# Patient Record
Sex: FEMALE | Race: WHITE | NOT HISPANIC OR LATINO | Employment: OTHER | URBAN - METROPOLITAN AREA
[De-identification: names, ages, dates, MRNs, and addresses within clinical notes are randomized per-mention and may not be internally consistent; named-entity substitution may affect disease eponyms.]

---

## 2021-05-10 ENCOUNTER — TELEPHONE (OUTPATIENT)
Dept: SCHEDULING | Facility: IMAGING CENTER | Age: 71
End: 2021-05-10

## 2021-05-13 ENCOUNTER — OFFICE VISIT (OUTPATIENT)
Dept: MEDICAL GROUP | Facility: MEDICAL CENTER | Age: 71
End: 2021-05-13
Payer: MEDICARE

## 2021-05-13 VITALS
WEIGHT: 182.32 LBS | HEART RATE: 71 BPM | TEMPERATURE: 97.5 F | HEIGHT: 65 IN | DIASTOLIC BLOOD PRESSURE: 78 MMHG | SYSTOLIC BLOOD PRESSURE: 116 MMHG | BODY MASS INDEX: 30.38 KG/M2 | OXYGEN SATURATION: 95 % | RESPIRATION RATE: 18 BRPM

## 2021-05-13 DIAGNOSIS — R32 INCONTINENCE OF URINE IN FEMALE: ICD-10-CM

## 2021-05-13 DIAGNOSIS — M25.551 CHRONIC RIGHT HIP PAIN: ICD-10-CM

## 2021-05-13 DIAGNOSIS — M54.10 BACK PAIN WITH RADICULOPATHY: ICD-10-CM

## 2021-05-13 DIAGNOSIS — Z77.22 SECOND HAND SMOKE EXPOSURE: ICD-10-CM

## 2021-05-13 DIAGNOSIS — Z12.11 SCREENING FOR COLON CANCER: ICD-10-CM

## 2021-05-13 DIAGNOSIS — M16.11 OSTEOARTHRITIS OF RIGHT HIP, UNSPECIFIED OSTEOARTHRITIS TYPE: ICD-10-CM

## 2021-05-13 DIAGNOSIS — E78.49 OTHER HYPERLIPIDEMIA: ICD-10-CM

## 2021-05-13 DIAGNOSIS — Z78.0 ASYMPTOMATIC MENOPAUSE: ICD-10-CM

## 2021-05-13 DIAGNOSIS — Z11.59 ENCOUNTER FOR HEPATITIS C SCREENING TEST FOR LOW RISK PATIENT: ICD-10-CM

## 2021-05-13 DIAGNOSIS — Z12.31 ENCOUNTER FOR SCREENING MAMMOGRAM FOR MALIGNANT NEOPLASM OF BREAST: ICD-10-CM

## 2021-05-13 DIAGNOSIS — M85.80 OSTEOPENIA, UNSPECIFIED LOCATION: ICD-10-CM

## 2021-05-13 DIAGNOSIS — R20.2 PARESTHESIA: ICD-10-CM

## 2021-05-13 DIAGNOSIS — G89.29 CHRONIC RIGHT HIP PAIN: ICD-10-CM

## 2021-05-13 PROBLEM — I87.2 PERIPHERAL VENOUS INSUFFICIENCY: Status: ACTIVE | Noted: 2020-01-10

## 2021-05-13 PROBLEM — I83.813 PAIN DUE TO VARICOSE VEINS OF BOTH LOWER EXTREMITIES: Status: ACTIVE | Noted: 2020-01-10

## 2021-05-13 PROCEDURE — 99204 OFFICE O/P NEW MOD 45 MIN: CPT | Performed by: INTERNAL MEDICINE

## 2021-05-13 RX ORDER — ESTRADIOL 0.1 MG/G
CREAM VAGINAL
COMMUNITY
Start: 2021-01-28 | End: 2022-03-02

## 2021-05-13 RX ORDER — GABAPENTIN 100 MG/1
CAPSULE ORAL
COMMUNITY
Start: 2020-04-30 | End: 2021-05-13

## 2021-05-13 RX ORDER — MELOXICAM 7.5 MG/1
TABLET ORAL
COMMUNITY
Start: 2019-07-22 | End: 2021-06-23 | Stop reason: SDUPTHER

## 2021-05-13 RX ORDER — ACETAMINOPHEN 500 MG
TABLET ORAL
COMMUNITY
Start: 2020-10-21 | End: 2021-10-21

## 2021-05-13 RX ORDER — DULOXETIN HYDROCHLORIDE 30 MG/1
30 CAPSULE, DELAYED RELEASE ORAL DAILY
Qty: 30 CAPSULE | Refills: 3 | Status: SHIPPED | OUTPATIENT
Start: 2021-05-13 | End: 2021-08-26 | Stop reason: SDUPTHER

## 2021-05-13 RX ORDER — TROSPIUM CHLORIDE 20 MG/1
20 TABLET, FILM COATED ORAL
COMMUNITY
Start: 2021-01-28 | End: 2021-05-13

## 2021-05-13 RX ORDER — ATORVASTATIN CALCIUM 40 MG/1
TABLET, FILM COATED ORAL
COMMUNITY
Start: 2020-06-05 | End: 2021-08-16 | Stop reason: SDUPTHER

## 2021-05-13 ASSESSMENT — PATIENT HEALTH QUESTIONNAIRE - PHQ9: CLINICAL INTERPRETATION OF PHQ2 SCORE: 0

## 2021-05-13 NOTE — PROGRESS NOTES
CC:  Diagnoses of Encounter for hepatitis C screening test for low risk patient, Chronic right hip pain, Encounter for screening mammogram for malignant neoplasm of breast, Back pain with radiculopathy, Paresthesia, Incontinence of urine in female, Asymptomatic menopause, Other hyperlipidemia, Osteoarthritis of right hip, unspecified osteoarthritis type, Osteopenia, unspecified location, Second hand smoke exposure, Screening for colon cancer, and BMI 30.0-30.9,adult were pertinent to this visit.    HISTORY OF THE PRESENT ILLNESS: Patient is a 70 y.o. female. This pleasant patient is here today to establish care.    Main concern is chronic right hip and lower back pain.  She has back pain radiate down her right leg towards her foot and sometimes to a lesser extent the left leg.  Has not noticed any leg weakness but sometimes difficult to tell through the pain.  Feet occasionally tingle.  No loss of bowel.  Does have urinary incontinence primarily just at night 1 time.  Did see urologist and reports she had a lot of testing done, was prescribed estrogen vaginal cream which was somewhat helpful and is doing Kegel exercises on her own.  She was told by her urologist that she would benefit from some additional testing so she would like to establish locally.  She has not been any other pharmaceutical therapy, she had trospium on her medication list but tells me she had not yet started as her urologist told her to hold off.  She has had some degree of physical therapy but does indicate she needs to reestablish.  She would also like local orthopedic specialist as she has had 3 steroid injections to right hip and 2 MARICARMEN procedures lumbar spine.  States her last MRI imaging of the hip/ lumbar area was roughly 6 months ago she will try to obtain this report for us.    L-MRI 10/8/2020  IMPRESSION:   1. Relatively mild degenerative type changes as described above.   2. No significant central narrowing. No compression or intrinsic    lesion of the conus or cauda equina.   3. Probable left L3 nerve root impingement in the lateral left L3-4   foramen.   4. Disc abuts both L4 nerve roots in the lateral L3-4 recesses   without definite nerve root impingement. Probable mild compression of   the left L4 nerve roots just past the left L4-5 foramen.         Allergies: Gabapentin    Current Outpatient Medications Ordered in Epic   Medication Sig Dispense Refill   • acetaminophen (TYLENOL) 500 MG Tab Take 2 tablet by mouth every 8 hours as needed for pain. Do not exceed 6 tablets in 24 hours     • atorvastatin (LIPITOR) 40 MG Tab Take 1 tablet by mouth daily to lower cholesterol and keep arteries open     • estradiol (ESTRACE) 0.1 MG/GM vaginal cream Insert 1 gram vaginally 2 to 3 times a week at bedtime     • hydrocortisone 2.5 % Cream topical cream Apply  topically.     • meloxicam (MOBIC) 7.5 MG Tab Take  by mouth.     • DULoxetine (CYMBALTA) 30 MG Cap DR Particles Take 1 capsule by mouth every day. 30 capsule 3     No current [facility]-ordered facility-administered medications on file.       Past Medical History:   Diagnosis Date   • Hyperlipidemia        Past Surgical History:   Procedure Laterality Date   • OTHER      trigger finger R; R radial fracture repair no hardare (pins taken out), CTR b/l        Social History     Tobacco Use   • Smoking status: Never Smoker   • Smokeless tobacco: Never Used   • Tobacco comment: second hand smoke casinos x 28 years   Vaping Use   • Vaping Use: Never used   Substance Use Topics   • Alcohol use: Yes     Comment: 1 per week   • Drug use: Not on file       Social History     Social History Narrative   • Not on file       Family History   Problem Relation Age of Onset   • Cancer Mother         breast. lung   • Cancer Father         colon   • Diabetes Brother        Exam: /78 (BP Location: Right arm, Patient Position: Sitting, BP Cuff Size: Adult)   Pulse 71   Temp 36.4 °C (97.5 °F) (Temporal)   Resp 18    "Ht 1.638 m (5' 4.5\")   Wt 82.7 kg (182 lb 5.1 oz)   SpO2 95%  Body mass index is 30.81 kg/m².    General: Normal appearing. No distress.  EYES: Conjunctiva clear lids without ptosis, pupils equal  EARS: Normal shape and contour   Pulmonary: Clear to ausculation.  Normal effort. No rales or wheezing.  Cardiovascular: Regular rate and rhythm without significant murmur.   Abdomen: Soft, nontender, nondistended. Normal bowel sounds.  Neurologic: Cranial nerves grossly nonfocal  Skin: Warm and dry.  No obvious lesions.  Musculoskeletal: Normal gait. No extremity cyanosis, clubbing, or edema.  Psych: Normal mood and affect. Alert and oriented x3. Judgment and insight is normal.      Assessment/Plan  1. Encounter for hepatitis C screening test for low risk patient  She is agreeable to this preventive health guideline given birth cohort  - HEP C VIRUS ANTIBODY; Future    2. Chronic right hip pain  She is already on meloxicam.  Did try gabapentin and this medication gave her depression.  She is agreeable to try Cymbalta, establish with physical therapy and follow-up orthopedic team.  Sounds like she does have significant arthritis in her hip and she may ultimately need a replacement.  Also asked her to obtain imaging (done approx 6 m/a) reports and CD as well as any other related information for her specialty team and also for me in follow-up.  - REFERRAL TO ORTHOPEDICS  - REFERRAL TO PHYSICAL THERAPY  - DULoxetine (CYMBALTA) 30 MG Cap DR Particles; Take 1 capsule by mouth every day.  Dispense: 30 capsule; Refill: 3    3. Encounter for screening mammogram for malignant neoplasm of breast  Asymptomatic due for screening  - MA-SCREENING MAMMO BILAT W/TOMOSYNTHESIS W/CAD; Future    4. Back pain with radiculopathy  See #2 above  - REFERRAL TO ORTHOPEDICS  - REFERRAL TO PHYSICAL THERAPY  - DULoxetine (CYMBALTA) 30 MG Cap DR Particles; Take 1 capsule by mouth every day.  Dispense: 30 capsule; Refill: 3    5. Paresthesia  See #2 " above likely the tingling in feet is due to lumbar source  - DULoxetine (CYMBALTA) 30 MG Cap DR Particles; Take 1 capsule by mouth every day.  Dispense: 30 capsule; Refill: 3  - CBC WITHOUT DIFFERENTIAL; Future  - Comp Metabolic Panel; Future    6. Incontinence of urine in female  See HPI  - REFERRAL TO PHYSICAL THERAPY  - REFERRAL TO UROLOGY    7. Asymptomatic menopause  - DS-BONE DENSITY STUDY (DEXA); Future    8. Other hyperlipidemia  Plan to reassess status and continue Lipitor 40 mg daily.  - Comp Metabolic Panel; Future  - Lipid Profile; Future    9. Osteoarthritis of right hip, unspecified osteoarthritis type  See discussion above    10. Osteopenia, unspecified location  Reassess bone density.  She is not currently on supplements for bone health recommended calcium to 1200 mg daily vitamin D 1000 units daily  - DS-BONE DENSITY STUDY (DEXA); Future    11. 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    12. Screening for colon cancer  States last colonoscopy about 5 years ago and she has family history of colon cancer in her father.  She has no current GI symptoms of concern.  - REFERRAL TO GI FOR COLONOSCOPY    13. BMI 30.0-30.9,adult  She has lost over 30 pounds in 2 years through nutrition counseling and she would like to continue this.  - Patient identified as having weight management issue.  Appropriate orders and counseling given.  - REFERRAL TO NUTRITION SERVICES      rtc 2-3m        Please note that this dictation was created using voice recognition software. I have made every reasonable attempt to correct obvious errors, but I expect that there are errors of grammar and possibly content that I did not discover before finalizing the note.

## 2021-05-20 ENCOUNTER — HOSPITAL ENCOUNTER (OUTPATIENT)
Dept: RADIOLOGY | Facility: MEDICAL CENTER | Age: 71
End: 2021-05-20
Attending: INTERNAL MEDICINE
Payer: MEDICARE

## 2021-05-20 DIAGNOSIS — M85.80 OSTEOPENIA, UNSPECIFIED LOCATION: ICD-10-CM

## 2021-05-20 DIAGNOSIS — Z78.0 ASYMPTOMATIC MENOPAUSE: ICD-10-CM

## 2021-05-20 PROCEDURE — 77080 DXA BONE DENSITY AXIAL: CPT

## 2021-06-08 ENCOUNTER — HOSPITAL ENCOUNTER (OUTPATIENT)
Dept: LAB | Facility: MEDICAL CENTER | Age: 71
End: 2021-06-08
Attending: INTERNAL MEDICINE
Payer: MEDICARE

## 2021-06-08 DIAGNOSIS — E78.49 OTHER HYPERLIPIDEMIA: ICD-10-CM

## 2021-06-08 DIAGNOSIS — R20.2 PARESTHESIA: ICD-10-CM

## 2021-06-08 DIAGNOSIS — Z11.59 ENCOUNTER FOR HEPATITIS C SCREENING TEST FOR LOW RISK PATIENT: ICD-10-CM

## 2021-06-08 LAB
ALBUMIN SERPL BCP-MCNC: 4.5 G/DL (ref 3.2–4.9)
ALBUMIN/GLOB SERPL: 1.7 G/DL
ALP SERPL-CCNC: 100 U/L (ref 30–99)
ALT SERPL-CCNC: 56 U/L (ref 2–50)
ANION GAP SERPL CALC-SCNC: 8 MMOL/L (ref 7–16)
AST SERPL-CCNC: 35 U/L (ref 12–45)
BILIRUB SERPL-MCNC: 0.4 MG/DL (ref 0.1–1.5)
BUN SERPL-MCNC: 13 MG/DL (ref 8–22)
CALCIUM SERPL-MCNC: 9.4 MG/DL (ref 8.5–10.5)
CHLORIDE SERPL-SCNC: 101 MMOL/L (ref 96–112)
CHOLEST SERPL-MCNC: 199 MG/DL (ref 100–199)
CO2 SERPL-SCNC: 28 MMOL/L (ref 20–33)
CREAT SERPL-MCNC: 0.5 MG/DL (ref 0.5–1.4)
ERYTHROCYTE [DISTWIDTH] IN BLOOD BY AUTOMATED COUNT: 46.5 FL (ref 35.9–50)
FASTING STATUS PATIENT QL REPORTED: NORMAL
GLOBULIN SER CALC-MCNC: 2.7 G/DL (ref 1.9–3.5)
GLUCOSE SERPL-MCNC: 102 MG/DL (ref 65–99)
HCT VFR BLD AUTO: 40.3 % (ref 37–47)
HCV AB SER QL: NORMAL
HDLC SERPL-MCNC: 87 MG/DL
HGB BLD-MCNC: 12.7 G/DL (ref 12–16)
LDLC SERPL CALC-MCNC: 95 MG/DL
MCH RBC QN AUTO: 28.1 PG (ref 27–33)
MCHC RBC AUTO-ENTMCNC: 31.5 G/DL (ref 33.6–35)
MCV RBC AUTO: 89.2 FL (ref 81.4–97.8)
PLATELET # BLD AUTO: 319 K/UL (ref 164–446)
PMV BLD AUTO: 10.2 FL (ref 9–12.9)
POTASSIUM SERPL-SCNC: 3.9 MMOL/L (ref 3.6–5.5)
PROT SERPL-MCNC: 7.2 G/DL (ref 6–8.2)
RBC # BLD AUTO: 4.52 M/UL (ref 4.2–5.4)
SODIUM SERPL-SCNC: 137 MMOL/L (ref 135–145)
TRIGL SERPL-MCNC: 84 MG/DL (ref 0–149)
WBC # BLD AUTO: 6.3 K/UL (ref 4.8–10.8)

## 2021-06-08 PROCEDURE — 86803 HEPATITIS C AB TEST: CPT

## 2021-06-08 PROCEDURE — 80061 LIPID PANEL: CPT

## 2021-06-08 PROCEDURE — 85027 COMPLETE CBC AUTOMATED: CPT

## 2021-06-08 PROCEDURE — 36415 COLL VENOUS BLD VENIPUNCTURE: CPT

## 2021-06-08 PROCEDURE — 80053 COMPREHEN METABOLIC PANEL: CPT

## 2021-06-09 ENCOUNTER — PATIENT MESSAGE (OUTPATIENT)
Dept: MEDICAL GROUP | Facility: MEDICAL CENTER | Age: 71
End: 2021-06-09

## 2021-06-09 ENCOUNTER — OFFICE VISIT (OUTPATIENT)
Dept: HEALTH INFORMATION MANAGEMENT | Facility: MEDICAL CENTER | Age: 71
End: 2021-06-09
Payer: MEDICARE

## 2021-06-09 VITALS — WEIGHT: 184.3 LBS | BODY MASS INDEX: 31.15 KG/M2

## 2021-06-09 DIAGNOSIS — E66.9 OBESITY, UNSPECIFIED CLASSIFICATION, UNSPECIFIED OBESITY TYPE, UNSPECIFIED WHETHER SERIOUS COMORBIDITY PRESENT: ICD-10-CM

## 2021-06-09 DIAGNOSIS — R29.818 SUSPECTED SLEEP APNEA: ICD-10-CM

## 2021-06-09 DIAGNOSIS — M16.11 OSTEOARTHRITIS OF RIGHT HIP, UNSPECIFIED OSTEOARTHRITIS TYPE: ICD-10-CM

## 2021-06-09 DIAGNOSIS — M54.10 BACK PAIN WITH RADICULOPATHY: ICD-10-CM

## 2021-06-09 DIAGNOSIS — E78.49 OTHER HYPERLIPIDEMIA: ICD-10-CM

## 2021-06-09 PROCEDURE — 97802 MEDICAL NUTRITION INDIV IN: CPT | Performed by: DIETITIAN, REGISTERED

## 2021-06-09 ASSESSMENT — FIBROSIS 4 INDEX: FIB4 SCORE: 1

## 2021-06-09 NOTE — PROGRESS NOTES
6/9/2021    iTa Saunders M.D.  70 y.o.   Time in/out: 8:33-9:31    Anthropometrics/Objective  Vitals:    06/09/21 0940   Weight: 83.6 kg (184 lb 4.8 oz)       Body mass index is 31.15 kg/m².    Stated Goal Weight: 150 lbs (last at this weight 20 years ago)  Estimated Caloric needs 1350 Kcal (MSJ)   See comprehensive patient history form for further information     Subjective:  - retired 11 years ago    - was caretaking for her second mother she states  - joint/back issues  - family history of cancers  - moved in with her brother and sister in law  - got cane last week  - per PT no movement requested   - seeing PT today and will ask  - would like to work on portions  - [facility] weight management program   - lost 30 lbs  - biggest challenge accountability  - successful with journaling   - disruptive sleep patterns d/t discomfort   - told she has stopped breathing in her sleep  - ortho in July    Nutrition Diagnosis (PES Statement)  Problem (Nutrition diagnosis)  Clinical: Obesity    Etiology(Addresses the cause,contributing factors)  Excessive energy intake    Signs/Symptoms (Address observations and stated info: subjective and objective data)  Reports or observations of CHO/PRO/FAT intake that is different from recommended types or ingested on an irregular basis      Client history:  Condition(s) associated with a diagnosis or treatment (specify) high cholesterol, elevated fasting blood glucose, compressed disks, osteopenia, second hand smoke exposure    Biochemical data, medical test and procedures  Lab Results   Component Value Date/Time    HBA1C 5.7 (H) 12/10/2020 12:52 PM   @  No results found for: POCGLUCOSE  Lab Results   Component Value Date/Time    CHOLSTRLTOT 199 06/08/2021 08:40 AM    LDL 95 06/08/2021 08:40 AM    HDL 87 06/08/2021 08:40 AM    TRIGLYCERIDE 84 06/08/2021 08:40 AM         Nutrition Intervention    Meal and Snack  Recommend a general/healthful diet    Comprehensive Nutrition  education Instruction or training leading to in-depth nutrition related knowledge about:  Combine carb, protein and fat at each meal, Meal timing and spacing and Portion control    Monitoring & Evaluation Plan    Behavioral-Environmental:  Behavior: track/journal intake     Food / Nutrient Intake:  Food intake: follow the plate    Physical Signs / Symptoms:  Weight change: loss of -0.5-2 lbs/week    Assessment Notes:  Ashley would like to continue working on her weight loss goals. She has successfully lost about 30 lbs with [facility] management program. Finds her weight increasing slowly with her move to [place]. At this visit we discussed and reviewed the plate method, snack ideas, DMBTP cookbook for meal ideas resources.   We reviewed her sleep and exercise habits. Sleep patterns are disrupted due to discomfort often, been told she stopped breathing when she sleeps - sent message to PCP requesting sleep study eval. Her movement is currently limited her PT request. Has been walking with a cane x1 week per their suggestion. Is interested in water movement if able. Ashley will connect with PT regarding this.  At next visit suggest reviewing nutrition basics class.     Follow up: 4-6 weeks

## 2021-06-16 ENCOUNTER — HOSPITAL ENCOUNTER (OUTPATIENT)
Dept: RADIOLOGY | Facility: MEDICAL CENTER | Age: 71
End: 2021-06-16
Payer: MEDICARE

## 2021-06-22 ENCOUNTER — TELEPHONE (OUTPATIENT)
Dept: MEDICAL GROUP | Facility: MEDICAL CENTER | Age: 71
End: 2021-06-22

## 2021-06-23 RX ORDER — MELOXICAM 7.5 MG/1
7.5 TABLET ORAL DAILY
Qty: 30 TABLET | Refills: 0 | Status: SHIPPED | OUTPATIENT
Start: 2021-06-23 | End: 2021-07-19 | Stop reason: SDUPTHER

## 2021-07-19 RX ORDER — MELOXICAM 7.5 MG/1
7.5 TABLET ORAL DAILY
Qty: 30 TABLET | Refills: 0 | Status: SHIPPED | OUTPATIENT
Start: 2021-07-19 | End: 2021-08-16 | Stop reason: SDUPTHER

## 2021-07-28 ENCOUNTER — PATIENT MESSAGE (OUTPATIENT)
Dept: HEALTH INFORMATION MANAGEMENT | Facility: OTHER | Age: 71
End: 2021-07-28

## 2021-07-29 PROBLEM — M25.551 RIGHT HIP PAIN: Status: ACTIVE | Noted: 2021-07-29

## 2021-08-16 ENCOUNTER — PATIENT MESSAGE (OUTPATIENT)
Dept: MEDICAL GROUP | Facility: MEDICAL CENTER | Age: 71
End: 2021-08-16

## 2021-08-16 RX ORDER — MELOXICAM 7.5 MG/1
7.5 TABLET ORAL DAILY
Qty: 30 TABLET | Refills: 3 | Status: SHIPPED | OUTPATIENT
Start: 2021-08-16 | End: 2021-12-17

## 2021-08-16 RX ORDER — ATORVASTATIN CALCIUM 40 MG/1
40 TABLET, FILM COATED ORAL DAILY
Qty: 90 TABLET | Refills: 3 | Status: SHIPPED | OUTPATIENT
Start: 2021-08-16 | End: 2022-05-04 | Stop reason: SDUPTHER

## 2021-08-16 NOTE — PATIENT COMMUNICATION
Received request via: Patient    Was the patient seen in the last year in this department? Yes    Does the patient have an active prescription (recently filled or refills available) for medication(s) requested? No     Requested Prescriptions     Pending Prescriptions Disp Refills   • atorvastatin (LIPITOR) 40 MG Tab 90 Tablet    • meloxicam (MOBIC) 7.5 MG Tab 30 Tablet 0     Sig: Take 1 Tablet by mouth every day.       Pt requests 3 month supply

## 2021-08-26 ENCOUNTER — OFFICE VISIT (OUTPATIENT)
Dept: MEDICAL GROUP | Facility: MEDICAL CENTER | Age: 71
End: 2021-08-26
Payer: MEDICARE

## 2021-08-26 VITALS
OXYGEN SATURATION: 94 % | TEMPERATURE: 97.9 F | WEIGHT: 181 LBS | HEIGHT: 65 IN | BODY MASS INDEX: 30.16 KG/M2 | SYSTOLIC BLOOD PRESSURE: 120 MMHG | DIASTOLIC BLOOD PRESSURE: 76 MMHG | HEART RATE: 65 BPM

## 2021-08-26 DIAGNOSIS — R73.01 IMPAIRED FASTING BLOOD SUGAR: ICD-10-CM

## 2021-08-26 DIAGNOSIS — E78.49 OTHER HYPERLIPIDEMIA: ICD-10-CM

## 2021-08-26 DIAGNOSIS — R74.8 ELEVATED LIVER ENZYMES: ICD-10-CM

## 2021-08-26 DIAGNOSIS — M25.551 CHRONIC RIGHT HIP PAIN: ICD-10-CM

## 2021-08-26 DIAGNOSIS — R32 INCONTINENCE OF URINE IN FEMALE: ICD-10-CM

## 2021-08-26 DIAGNOSIS — G89.29 CHRONIC RIGHT HIP PAIN: ICD-10-CM

## 2021-08-26 DIAGNOSIS — R29.818 SUSPECTED SLEEP APNEA: ICD-10-CM

## 2021-08-26 DIAGNOSIS — R20.2 PARESTHESIA: ICD-10-CM

## 2021-08-26 DIAGNOSIS — M54.10 BACK PAIN WITH RADICULOPATHY: ICD-10-CM

## 2021-08-26 PROBLEM — M43.10 SPONDYLOLISTHESIS: Status: ACTIVE | Noted: 2021-07-13

## 2021-08-26 PROBLEM — M48.061 SPINAL STENOSIS OF LUMBAR REGION: Status: ACTIVE | Noted: 2021-07-13

## 2021-08-26 PROCEDURE — 99214 OFFICE O/P EST MOD 30 MIN: CPT | Performed by: INTERNAL MEDICINE

## 2021-08-26 RX ORDER — DIPHENHYDRAMINE HCL 25 MG
25 TABLET ORAL PRN
COMMUNITY

## 2021-08-26 RX ORDER — DULOXETIN HYDROCHLORIDE 30 MG/1
30 CAPSULE, DELAYED RELEASE ORAL 2 TIMES DAILY
Qty: 200 CAPSULE | Refills: 3 | Status: SHIPPED | OUTPATIENT
Start: 2021-08-26 | End: 2022-02-14

## 2021-08-26 ASSESSMENT — FIBROSIS 4 INDEX: FIB4 SCORE: 1.01

## 2021-08-26 NOTE — PROGRESS NOTES
CC:  Diagnoses of Chronic right hip pain, Back pain with radiculopathy, Paresthesia, BMI 30.0-30.9,adult, Elevated liver enzymes, Impaired fasting blood sugar, Other hyperlipidemia, Suspected sleep apnea, and Incontinence of urine in female were pertinent to this visit.    HISTORY OF THE PRESENT ILLNESS: Patient is a 71 y.o. female. This pleasant patient is here today for follow-up.    Since last visit she has follow-up with the specialist.    PT note indicates plan for right total hip 10/20/2021.  The neurosurgeon Dr. Jolley subsequently plans to do perform procedure to improve her back pain symptoms and until then bridge her with MARICARMEN.  Cymbalta has helped her pain some but mostly her mood feels improved and she would like to try a higher dose.  She is hopeful that possibly higher dose of Cymbalta will also improve her urinary symptoms.  Pelvic physical therapy has had helped her urinary incontinence and she did also meet with a urologist who recommended that she start Myrbetriq.  She has not yet started Myrbetriq, she wants to wait and see and try to minimize pharmacotherapy.    Did get her colonoscopy 7/13/2021, due again in 5 years.  No current GI symptoms.  Lab 6/8/2021 show AST of 56, she has 1 drink of alcohol per week or less, for pain she takes Tylenol 2448-3500 mg daily, has no focal GI symptoms such as nausea/vomiting/pain, etc. BMI 30.  Metabolic panel also showed fasting glucose of 102 but otherwise CBC was reasonable and cholesterol was controlled total cholesterol 199, triglycerides 84, HDL 87, LDL 95.    Bone density scan 5/20/2021 showed lumbar T score -1.2 with some osteopenia, left hip T score -0.3 overall 10-year fracture score is 13.4% and at the hip 1.1%.  She says she is on calcium, vitamin D.  She would like to start daily aerobic exercise and some weight lifting once her back and hip are better.    She is tracking down her mammogram report she thinks it was roughly 3 months ago but it is not  "in our system.    She still wants evaluated sleep apnea she says her friends have told her she has apneic episodes.    Allergies: Gabapentin    Current Outpatient Medications Ordered in Epic   Medication Sig Dispense Refill   • DULoxetine (CYMBALTA) 30 MG Cap DR Particles Take 1 Capsule by mouth 2 times a day. 200 Capsule 3   • atorvastatin (LIPITOR) 40 MG Tab Take 1 Tablet by mouth every day. 90 Tablet 3   • meloxicam (MOBIC) 7.5 MG Tab Take 1 Tablet by mouth every day. 30 Tablet 3   • acetaminophen (TYLENOL) 500 MG Tab Take 2 tablet by mouth every 8 hours as needed for pain. Do not exceed 6 tablets in 24 hours     • ASPIRIN 81 PO Take 1 Tablet by mouth every day.     • estradiol (ESTRACE) 0.1 MG/GM vaginal cream Insert 1 gram vaginally 2 to 3 times a week at bedtime (Patient not taking: Reported on 8/26/2021)       No current [facility]-ordered facility-administered medications on file.       Past Medical History:   Diagnosis Date   • Hyperlipidemia        Past Surgical History:   Procedure Laterality Date   • OTHER      trigger finger R; R radial fracture repair no hardare (pins taken out), CTR b/l        Social History     Tobacco Use   • Smoking status: Never Smoker   • Smokeless tobacco: Never Used   • Tobacco comment: second hand smoke casinos x 28 years   Vaping Use   • Vaping Use: Never used   Substance Use Topics   • Alcohol use: Yes     Comment: 1 per week   • Drug use: Not on file       Social History     Social History Narrative   • Not on file       Family History   Problem Relation Age of Onset   • Cancer Mother         breast. lung   • Cancer Father         colon   • Diabetes Brother        Exam: /76 (BP Location: Right arm, Patient Position: Sitting, BP Cuff Size: Adult)   Pulse 65   Temp 36.6 °C (97.9 °F) (Temporal)   Ht 1.64 m (5' 4.57\")   Wt 82.1 kg (181 lb)   SpO2 94%  Body mass index is 30.52 kg/m².    General: Normal appearing. No distress.  EYES: Conjunctiva clear lids without ptosis, " pupils equal  EARS: Normal shape and contour   Pulmonary: Clear to ausculation.  Normal effort. No rales or wheezing.  Cardiovascular: Regular rate and rhythm   Abdomen: Soft, nontender, nondistended. Normal bowel sounds.  Neurologic: Cranial nerves grossly nonfocal  Skin: Warm and dry.  No obvious lesions.  Musculoskeletal: Normal gait. No extremity cyanosis, clubbing, or edema.  Psych: Normal mood and affect. Alert and oriented x3. Judgment and insight is normal.      Assessment/Plan  1. Chronic right hip pain  And uptitrate Cymbalta from 30 mg to total daily dose 60 mg/day.  Patient prefers divided doses.  We will follow up response.  - DULoxetine (CYMBALTA) 30 MG Cap DR Particles; Take 1 Capsule by mouth 2 times a day.  Dispense: 200 Capsule; Refill: 3    2. Back pain with radiculopathy  Reference #1 above, in addition she is pending right hip replacement in neurosurgery for her symptoms as well as continuing physical therapy.  - DULoxetine (CYMBALTA) 30 MG Cap DR Particles; Take 1 Capsule by mouth 2 times a day.  Dispense: 200 Capsule; Refill: 3    3. Paresthesia  Refer to #1 and 2 above  - DULoxetine (CYMBALTA) 30 MG Cap DR Particles; Take 1 Capsule by mouth 2 times a day.  Dispense: 200 Capsule; Refill: 3    4. BMI 30.0-30.9,adult  We will work on increasing exercise and other weight loss measures when she is feeling better from a musculoskeletal standpoint.  For now goal to lose 10 pounds suspect minimal elevation liver enzyme is due to fatty liver.  Since she is asymptomatic we decided liver imaging is not needed but will follow-up on LFT trend.  Also recommend [facility] for free gene testing as it relates to hepatic steatosis.    5. Elevated liver enzymes  #4 above  - HEPATIC FUNCTION PANEL; Future    6. Impaired fasting blood sugar  Diet and exercise encouraged, we will reevaluate  - HEMOGLOBIN A1C; Future    7. Other hyperlipidemia  Controlled continue Lipitor 40 mg daily.    8. Suspected  sleep apnea  - REFERRAL TO PULMONARY AND SLEEP MEDICINE    9. Incontinence of urine in female  Advised would be reasonable to start Myrbetriq as recommended by her urologist.  Potential that uptitrating Cymbalta may have some benefit.  Cymbalta is off label treatment for stress urinary incontinence in women. Continue pelvic physical therapy.        RTC 3 months annual wellness      Please note that this dictation was created using voice recognition software. I have made every reasonable attempt to correct obvious errors, but I expect that there are errors of grammar and possibly content that I did not discover before finalizing the note.

## 2021-08-26 NOTE — LETTER
[facility]  Tia Saunders M.D.  [address]  [place] [address]  Fax: [phone]   Authorization for Release/Disclosure of   Protected Health Information   Name: GALE GUSMAN : 1950 SSN: [id number]   Address: [address] [place] [address]  [place] [address] Phone:    [phone] (home)    I authorize the entity listed below to release/disclose the PHI below to:   [facility]/Tia Saunders M.D. and Tia Saunders M.D.   Provider or Entity Name:  [facility]  Dr. Jolley   [facility], [address]  [address], [place] [address] Phone:  [phone]    Fax:   [phone]   Reason for request: continuity of care   Information to be released:    [  ] LAST COLONOSCOPY,  including any PATH REPORT and follow-up  [  ] LAST FIT/COLOGUARD RESULT [  ] LAST DEXA  [  ] LAST MAMMOGRAM  [  ] LAST PAP  [  ] LAST LABS [  ] RETINA EXAM REPORT  [  ] IMMUNIZATION RECORDS  [ x] Release all info      [  ] Check here and initial the line next to each item to release ALL health information INCLUDING  _____ Care and treatment for drug and / or alcohol abuse  _____ HIV testing, infection status, or AIDS  _____ Genetic Testing    DATES OF SERVICE OR TIME PERIOD TO BE DISCLOSED: _____________  I understand and acknowledge that:  * This Authorization may be revoked at any time by you in writing, except if your health information has already been used or disclosed.  * Your health information that will be used or disclosed as a result of you signing this authorization could be re-disclosed by the recipient. If this occurs, your re-disclosed health information may no longer be protected by State or Federal laws.  * You may refuse to sign this Authorization. Your refusal will not affect your ability to obtain treatment.  * This Authorization becomes effective upon signing and will  on (date) __________.      If no date is indicated, this Authorization will  one (1) year from the  signature date.    Name: Garima Banda    Signature:   Date:     8/26/2021       PLEASE FAX REQUESTED RECORDS BACK TO: [phone]

## 2021-09-08 ENCOUNTER — PATIENT MESSAGE (OUTPATIENT)
Dept: MEDICAL GROUP | Facility: MEDICAL CENTER | Age: 71
End: 2021-09-08

## 2021-09-17 ENCOUNTER — TELEPHONE (OUTPATIENT)
Dept: HEALTH INFORMATION MANAGEMENT | Facility: OTHER | Age: 71
End: 2021-09-17

## 2021-09-17 NOTE — TELEPHONE ENCOUNTER
Outcome: Received televox scheduled mammo    Please transfer to Patient Outreach Team at [phone] when patient returns call.        [facility] Verified: yes    Attempt # 1

## 2021-09-20 ENCOUNTER — HOSPITAL ENCOUNTER (OUTPATIENT)
Dept: LAB | Facility: MEDICAL CENTER | Age: 71
End: 2021-09-20
Attending: INTERNAL MEDICINE
Payer: MEDICARE

## 2021-09-20 DIAGNOSIS — R73.01 IMPAIRED FASTING BLOOD SUGAR: ICD-10-CM

## 2021-09-20 DIAGNOSIS — R74.8 ELEVATED LIVER ENZYMES: ICD-10-CM

## 2021-09-20 LAB
EST. AVERAGE GLUCOSE BLD GHB EST-MCNC: 120 MG/DL
HBA1C MFR BLD: 5.8 % (ref 4–5.6)

## 2021-09-20 PROCEDURE — 83036 HEMOGLOBIN GLYCOSYLATED A1C: CPT

## 2021-09-20 PROCEDURE — 36415 COLL VENOUS BLD VENIPUNCTURE: CPT

## 2021-09-20 PROCEDURE — 80076 HEPATIC FUNCTION PANEL: CPT

## 2021-09-21 LAB
ALBUMIN SERPL BCP-MCNC: 4.5 G/DL (ref 3.2–4.9)
ALP SERPL-CCNC: 84 U/L (ref 30–99)
ALT SERPL-CCNC: 15 U/L (ref 2–50)
AST SERPL-CCNC: 21 U/L (ref 12–45)
BILIRUB CONJ SERPL-MCNC: <0.2 MG/DL (ref 0.1–0.5)
BILIRUB INDIRECT SERPL-MCNC: NORMAL MG/DL (ref 0–1)
BILIRUB SERPL-MCNC: 0.4 MG/DL (ref 0.1–1.5)
PROT SERPL-MCNC: 6.9 G/DL (ref 6–8.2)

## 2021-10-12 ENCOUNTER — PRE-ADMISSION TESTING (OUTPATIENT)
Dept: ADMISSIONS | Facility: MEDICAL CENTER | Age: 71
End: 2021-10-12
Attending: ORTHOPAEDIC SURGERY
Payer: MEDICARE

## 2021-10-12 DIAGNOSIS — Z01.810 PRE-OPERATIVE CARDIOVASCULAR EXAMINATION: ICD-10-CM

## 2021-10-12 DIAGNOSIS — Z01.812 PRE-OPERATIVE LABORATORY EXAMINATION: ICD-10-CM

## 2021-10-12 DIAGNOSIS — M25.551 RIGHT HIP PAIN: ICD-10-CM

## 2021-10-12 LAB
ABO GROUP BLD: NORMAL
ALBUMIN SERPL BCP-MCNC: 5 G/DL (ref 3.2–4.9)
ALBUMIN/GLOB SERPL: 1.9 G/DL
ALP SERPL-CCNC: 98 U/L (ref 30–99)
ALT SERPL-CCNC: 17 U/L (ref 2–50)
ANION GAP SERPL CALC-SCNC: 10 MMOL/L (ref 7–16)
APTT PPP: 33 SEC (ref 24.7–36)
AST SERPL-CCNC: 21 U/L (ref 12–45)
BASOPHILS # BLD AUTO: 0.6 % (ref 0–1.8)
BASOPHILS # BLD: 0.03 K/UL (ref 0–0.12)
BILIRUB SERPL-MCNC: 0.3 MG/DL (ref 0.1–1.5)
BUN SERPL-MCNC: 12 MG/DL (ref 8–22)
CALCIUM SERPL-MCNC: 9.9 MG/DL (ref 8.4–10.2)
CHLORIDE SERPL-SCNC: 102 MMOL/L (ref 96–112)
CO2 SERPL-SCNC: 29 MMOL/L (ref 20–33)
CREAT SERPL-MCNC: 0.58 MG/DL (ref 0.5–1.4)
EOSINOPHIL # BLD AUTO: 0.14 K/UL (ref 0–0.51)
EOSINOPHIL NFR BLD: 2.7 % (ref 0–6.9)
ERYTHROCYTE [DISTWIDTH] IN BLOOD BY AUTOMATED COUNT: 43.4 FL (ref 35.9–50)
GLOBULIN SER CALC-MCNC: 2.6 G/DL (ref 1.9–3.5)
GLUCOSE SERPL-MCNC: 95 MG/DL (ref 65–99)
HCT VFR BLD AUTO: 40.2 % (ref 37–47)
HGB BLD-MCNC: 12.9 G/DL (ref 12–16)
HIV 1+2 AB+HIV1 P24 AG SERPL QL IA: NORMAL
IMM GRANULOCYTES # BLD AUTO: 0.01 K/UL (ref 0–0.11)
IMM GRANULOCYTES NFR BLD AUTO: 0.2 % (ref 0–0.9)
LYMPHOCYTES # BLD AUTO: 1.32 K/UL (ref 1–4.8)
LYMPHOCYTES NFR BLD: 25.1 % (ref 22–41)
MCH RBC QN AUTO: 28.3 PG (ref 27–33)
MCHC RBC AUTO-ENTMCNC: 32.1 G/DL (ref 33.6–35)
MCV RBC AUTO: 88.2 FL (ref 81.4–97.8)
MONOCYTES # BLD AUTO: 0.4 K/UL (ref 0–0.85)
MONOCYTES NFR BLD AUTO: 7.6 % (ref 0–13.4)
NEUTROPHILS # BLD AUTO: 3.36 K/UL (ref 2–7.15)
NEUTROPHILS NFR BLD: 63.8 % (ref 44–72)
NRBC # BLD AUTO: 0 K/UL
NRBC BLD-RTO: 0 /100 WBC
PLATELET # BLD AUTO: 283 K/UL (ref 164–446)
PMV BLD AUTO: 10.1 FL (ref 9–12.9)
POTASSIUM SERPL-SCNC: 4.2 MMOL/L (ref 3.6–5.5)
PROT SERPL-MCNC: 7.6 G/DL (ref 6–8.2)
RBC # BLD AUTO: 4.56 M/UL (ref 4.2–5.4)
RH BLD: NORMAL
SODIUM SERPL-SCNC: 141 MMOL/L (ref 135–145)
WBC # BLD AUTO: 5.3 K/UL (ref 4.8–10.8)

## 2021-10-12 PROCEDURE — 86900 BLOOD TYPING SEROLOGIC ABO: CPT

## 2021-10-12 PROCEDURE — 85730 THROMBOPLASTIN TIME PARTIAL: CPT

## 2021-10-12 PROCEDURE — 87640 STAPH A DNA AMP PROBE: CPT | Mod: XU

## 2021-10-12 PROCEDURE — 85025 COMPLETE CBC W/AUTO DIFF WBC: CPT

## 2021-10-12 PROCEDURE — 36415 COLL VENOUS BLD VENIPUNCTURE: CPT

## 2021-10-12 PROCEDURE — 80053 COMPREHEN METABOLIC PANEL: CPT

## 2021-10-12 PROCEDURE — 87389 HIV-1 AG W/HIV-1&-2 AB AG IA: CPT

## 2021-10-12 PROCEDURE — 86901 BLOOD TYPING SEROLOGIC RH(D): CPT

## 2021-10-12 PROCEDURE — 87641 MR-STAPH DNA AMP PROBE: CPT

## 2021-10-12 PROCEDURE — 93005 ELECTROCARDIOGRAM TRACING: CPT

## 2021-10-12 ASSESSMENT — FIBROSIS 4 INDEX: FIB4 SCORE: 1.17

## 2021-10-12 NOTE — DISCHARGE PLANNING
DISCHARGE PLANNING NOTE - TOTAL JOINT    Procedure: Procedure(s):  ARTHROPLASTY, HIP, TOTAL  Procedure Date: 10/20/2021  Insurance: Payor: S[employer]US / Plan: S[employer]IOR [employer] PLUS / Product Type: *No Product type* /    Equipment currently available at home?  front-wheel walker and shower chair  Steps into the home? 1  Steps within the home? 0  Toilet height? ADA  Type of shower? walk-in shower  Who will be with you during your recovery? Brother and sister-in-law whom she lives with.  Did you take the Total Joint Class and where? Yes, received NAON book today.  Planning same day discharge?No pt states she is staying over.    This writer met with pt during her preadmission appointment. Pt states she has all needed equipment.  Home safety checklist reviewed and copy given to pt. Pt educated to dc criteria. All questions answered and verbalizes understanding of all instructions. No dc needs identified at this time. Anticipate dc to home without barriers.

## 2021-10-12 NOTE — PREPROCEDURE INSTRUCTIONS
Pre admit appointment completed. History and medications reviewed. Pre-op instructions given, hand outs reviewed and questions answered. Pre admit video watched. Total Joint instructions given.    Anesthesia fasting guidelines reviewed. Patient instructed to continue regularly prescribed medications through the day before surgery. Per anesthesia protocol, patient instructed to take these medications with a sip of water the day of surgery: tylenol and cymbalta. Stopped ASA on 10/8, stopped vitamins on 10/10 per physicians instructions. Instructed patient to reach out to surgeon for meloxicam instructions.    Covid-19 vaccinated, instructed to wear a mask in per CDC guidelines and notify MD office with any signs/symptoms of illness prior to procedure. Covid test on 10/15 on testing line at [facility].

## 2021-10-13 LAB
EKG IMPRESSION: NORMAL
SCCMEC + MECA PNL NOSE NAA+PROBE: NEGATIVE
SCCMEC + MECA PNL NOSE NAA+PROBE: NEGATIVE

## 2021-10-13 PROCEDURE — 93010 ELECTROCARDIOGRAM REPORT: CPT | Performed by: INTERNAL MEDICINE

## 2021-10-15 ENCOUNTER — APPOINTMENT (OUTPATIENT)
Dept: ADMISSIONS | Facility: MEDICAL CENTER | Age: 71
End: 2021-10-15
Attending: ORTHOPAEDIC SURGERY
Payer: MEDICARE

## 2021-10-15 DIAGNOSIS — Z01.812 PRE-OPERATIVE LABORATORY EXAMINATION: ICD-10-CM

## 2021-10-15 PROCEDURE — U0003 INFECTIOUS AGENT DETECTION BY NUCLEIC ACID (DNA OR RNA); SEVERE ACUTE RESPIRATORY SYNDROME CORONAVIRUS 2 (SARS-COV-2) (CORONAVIRUS DISEASE [COVID-19]), AMPLIFIED PROBE TECHNIQUE, MAKING USE OF HIGH THROUGHPUT TECHNOLOGIES AS DESCRIBED BY CMS-2020-01-R: HCPCS

## 2021-10-15 PROCEDURE — C9803 HOPD COVID-19 SPEC COLLECT: HCPCS

## 2021-10-15 PROCEDURE — U0005 INFEC AGEN DETEC AMPLI PROBE: HCPCS

## 2021-10-16 LAB
SARS-COV-2 RNA RESP QL NAA+PROBE: NOTDETECTED
SPECIMEN SOURCE: NORMAL

## 2021-10-18 ENCOUNTER — PATIENT MESSAGE (OUTPATIENT)
Dept: MEDICAL GROUP | Facility: MEDICAL CENTER | Age: 71
End: 2021-10-18

## 2021-10-20 ENCOUNTER — ANESTHESIA EVENT (OUTPATIENT)
Dept: SURGERY | Facility: MEDICAL CENTER | Age: 71
End: 2021-10-20
Payer: MEDICARE

## 2021-10-20 ENCOUNTER — ANESTHESIA (OUTPATIENT)
Dept: SURGERY | Facility: MEDICAL CENTER | Age: 71
End: 2021-10-20
Payer: MEDICARE

## 2021-10-20 ENCOUNTER — APPOINTMENT (OUTPATIENT)
Dept: RADIOLOGY | Facility: MEDICAL CENTER | Age: 71
End: 2021-10-20
Attending: ORTHOPAEDIC SURGERY
Payer: MEDICARE

## 2021-10-20 ENCOUNTER — HOSPITAL ENCOUNTER (OUTPATIENT)
Facility: MEDICAL CENTER | Age: 71
End: 2021-10-21
Attending: ORTHOPAEDIC SURGERY | Admitting: ORTHOPAEDIC SURGERY
Payer: MEDICARE

## 2021-10-20 DIAGNOSIS — G89.18 POSTOPERATIVE PAIN: ICD-10-CM

## 2021-10-20 DIAGNOSIS — M25.551 RIGHT HIP PAIN: ICD-10-CM

## 2021-10-20 DIAGNOSIS — Z01.812 PRE-OPERATIVE LABORATORY EXAMINATION: ICD-10-CM

## 2021-10-20 LAB
ABO GROUP BLD: NORMAL
BLD GP AB SCN SERPL QL: NORMAL
INR PPP: 1.02 (ref 0.87–1.13)
PROTHROMBIN TIME: 12.6 SEC (ref 12–14.6)
RH BLD: NORMAL

## 2021-10-20 PROCEDURE — 700105 HCHG RX REV CODE 258: Performed by: ORTHOPAEDIC SURGERY

## 2021-10-20 PROCEDURE — C1776 JOINT DEVICE (IMPLANTABLE): HCPCS | Performed by: ORTHOPAEDIC SURGERY

## 2021-10-20 PROCEDURE — 700101 HCHG RX REV CODE 250: Performed by: ANESTHESIOLOGY

## 2021-10-20 PROCEDURE — 700111 HCHG RX REV CODE 636 W/ 250 OVERRIDE (IP): Performed by: ANESTHESIOLOGY

## 2021-10-20 PROCEDURE — A9270 NON-COVERED ITEM OR SERVICE: HCPCS | Performed by: ANESTHESIOLOGY

## 2021-10-20 PROCEDURE — 27130 TOTAL HIP ARTHROPLASTY: CPT | Mod: ASROC,RT | Performed by: SPECIALIST/TECHNOLOGIST, OTHER

## 2021-10-20 PROCEDURE — 501838 HCHG SUTURE GENERAL: Performed by: ORTHOPAEDIC SURGERY

## 2021-10-20 PROCEDURE — 160031 HCHG SURGERY MINUTES - 1ST 30 MINS LEVEL 5: Performed by: ORTHOPAEDIC SURGERY

## 2021-10-20 PROCEDURE — 502578 HCHG PACK, TOTAL HIP: Performed by: ORTHOPAEDIC SURGERY

## 2021-10-20 PROCEDURE — 27130 TOTAL HIP ARTHROPLASTY: CPT | Mod: RT | Performed by: ORTHOPAEDIC SURGERY

## 2021-10-20 PROCEDURE — 160009 HCHG ANES TIME/MIN: Performed by: ORTHOPAEDIC SURGERY

## 2021-10-20 PROCEDURE — G0378 HOSPITAL OBSERVATION PER HR: HCPCS

## 2021-10-20 PROCEDURE — 700102 HCHG RX REV CODE 250 W/ 637 OVERRIDE(OP): Performed by: ANESTHESIOLOGY

## 2021-10-20 PROCEDURE — 96375 TX/PRO/DX INJ NEW DRUG ADDON: CPT | Mod: XU

## 2021-10-20 PROCEDURE — 160002 HCHG RECOVERY MINUTES (STAT): Performed by: ORTHOPAEDIC SURGERY

## 2021-10-20 PROCEDURE — 700102 HCHG RX REV CODE 250 W/ 637 OVERRIDE(OP): Performed by: ORTHOPAEDIC SURGERY

## 2021-10-20 PROCEDURE — 94669 MECHANICAL CHEST WALL OSCILL: CPT

## 2021-10-20 PROCEDURE — 94760 N-INVAS EAR/PLS OXIMETRY 1: CPT

## 2021-10-20 PROCEDURE — 96365 THER/PROPH/DIAG IV INF INIT: CPT | Mod: XU

## 2021-10-20 PROCEDURE — A9270 NON-COVERED ITEM OR SERVICE: HCPCS | Performed by: ORTHOPAEDIC SURGERY

## 2021-10-20 PROCEDURE — 85610 PROTHROMBIN TIME: CPT

## 2021-10-20 PROCEDURE — 96376 TX/PRO/DX INJ SAME DRUG ADON: CPT | Mod: XU

## 2021-10-20 PROCEDURE — 72170 X-RAY EXAM OF PELVIS: CPT

## 2021-10-20 PROCEDURE — 503339 HCHG DRESSSING PICO: Performed by: ORTHOPAEDIC SURGERY

## 2021-10-20 PROCEDURE — 86900 BLOOD TYPING SEROLOGIC ABO: CPT

## 2021-10-20 PROCEDURE — 700101 HCHG RX REV CODE 250: Performed by: ORTHOPAEDIC SURGERY

## 2021-10-20 PROCEDURE — 86850 RBC ANTIBODY SCREEN: CPT

## 2021-10-20 PROCEDURE — C1713 ANCHOR/SCREW BN/BN,TIS/BN: HCPCS | Performed by: ORTHOPAEDIC SURGERY

## 2021-10-20 PROCEDURE — 160035 HCHG PACU - 1ST 60 MINS PHASE I: Performed by: ORTHOPAEDIC SURGERY

## 2021-10-20 PROCEDURE — 700111 HCHG RX REV CODE 636 W/ 250 OVERRIDE (IP): Performed by: ORTHOPAEDIC SURGERY

## 2021-10-20 PROCEDURE — 86901 BLOOD TYPING SEROLOGIC RH(D): CPT

## 2021-10-20 PROCEDURE — 160048 HCHG OR STATISTICAL LEVEL 1-5: Performed by: ORTHOPAEDIC SURGERY

## 2021-10-20 PROCEDURE — 160042 HCHG SURGERY MINUTES - EA ADDL 1 MIN LEVEL 5: Performed by: ORTHOPAEDIC SURGERY

## 2021-10-20 DEVICE — IMPLANT REF SPHER HEAD SCREW 20MM (1EA): Type: IMPLANTABLE DEVICE | Site: HIP | Status: FUNCTIONAL

## 2021-10-20 DEVICE — IMPLANT REF SPHER HEAD SCREW 35MM (1EA): Type: IMPLANTABLE DEVICE | Site: HIP | Status: FUNCTIONAL

## 2021-10-20 DEVICE — IMPLANT R3 0 DEG XLPE ACET LNR 32MM X 50MM (1EA): Type: IMPLANTABLE DEVICE | Site: HIP | Status: FUNCTIONAL

## 2021-10-20 DEVICE — IMPLANTABLE DEVICE: Type: IMPLANTABLE DEVICE | Site: HIP | Status: FUNCTIONAL

## 2021-10-20 DEVICE — IMPLANT R3 3 HOLE ACET SHELL 50MM (1EA): Type: IMPLANTABLE DEVICE | Site: HIP | Status: FUNCTIONAL

## 2021-10-20 DEVICE — IMPLANT OXINIUM FEM HD 12/14 32MM +0 (1EA): Type: IMPLANTABLE DEVICE | Site: HIP | Status: FUNCTIONAL

## 2021-10-20 RX ORDER — ONDANSETRON 2 MG/ML
INJECTION INTRAMUSCULAR; INTRAVENOUS PRN
Status: DISCONTINUED | OUTPATIENT
Start: 2021-10-20 | End: 2021-10-20 | Stop reason: SURG

## 2021-10-20 RX ORDER — TRANEXAMIC ACID 100 MG/ML
INJECTION, SOLUTION INTRAVENOUS PRN
Status: DISCONTINUED | OUTPATIENT
Start: 2021-10-20 | End: 2021-10-20 | Stop reason: SURG

## 2021-10-20 RX ORDER — CEFAZOLIN SODIUM IN 0.9 % NACL 2 G/100 ML
2 PLASTIC BAG, INJECTION (ML) INTRAVENOUS ONCE
Status: DISCONTINUED | OUTPATIENT
Start: 2021-10-20 | End: 2021-10-20 | Stop reason: HOSPADM

## 2021-10-20 RX ORDER — DULOXETIN HYDROCHLORIDE 30 MG/1
30 CAPSULE, DELAYED RELEASE ORAL 2 TIMES DAILY
Status: DISCONTINUED | OUTPATIENT
Start: 2021-10-20 | End: 2021-10-21 | Stop reason: HOSPADM

## 2021-10-20 RX ORDER — BUPIVACAINE HYDROCHLORIDE AND EPINEPHRINE 2.5; 5 MG/ML; UG/ML
INJECTION, SOLUTION EPIDURAL; INFILTRATION; INTRACAUDAL; PERINEURAL
Status: DISCONTINUED | OUTPATIENT
Start: 2021-10-20 | End: 2021-10-20 | Stop reason: HOSPADM

## 2021-10-20 RX ORDER — ONDANSETRON 2 MG/ML
4 INJECTION INTRAMUSCULAR; INTRAVENOUS EVERY 4 HOURS PRN
Status: DISCONTINUED | OUTPATIENT
Start: 2021-10-20 | End: 2021-10-21 | Stop reason: HOSPADM

## 2021-10-20 RX ORDER — IBUPROFEN 400 MG/1
800 TABLET ORAL 3 TIMES DAILY PRN
Status: DISCONTINUED | OUTPATIENT
Start: 2021-10-23 | End: 2021-10-21 | Stop reason: HOSPADM

## 2021-10-20 RX ORDER — LIDOCAINE HYDROCHLORIDE 40 MG/ML
SOLUTION TOPICAL PRN
Status: DISCONTINUED | OUTPATIENT
Start: 2021-10-20 | End: 2021-10-20 | Stop reason: HOSPADM

## 2021-10-20 RX ORDER — AMOXICILLIN 250 MG
1 CAPSULE ORAL NIGHTLY
Status: DISCONTINUED | OUTPATIENT
Start: 2021-10-20 | End: 2021-10-21 | Stop reason: HOSPADM

## 2021-10-20 RX ORDER — ONDANSETRON 2 MG/ML
4 INJECTION INTRAMUSCULAR; INTRAVENOUS
Status: DISCONTINUED | OUTPATIENT
Start: 2021-10-20 | End: 2021-10-20 | Stop reason: HOSPADM

## 2021-10-20 RX ORDER — ACETAMINOPHEN 500 MG
1000 TABLET ORAL EVERY 6 HOURS
Status: DISCONTINUED | OUTPATIENT
Start: 2021-10-20 | End: 2021-10-21 | Stop reason: HOSPADM

## 2021-10-20 RX ORDER — OXYCODONE HCL 5 MG/5 ML
10 SOLUTION, ORAL ORAL
Status: COMPLETED | OUTPATIENT
Start: 2021-10-20 | End: 2021-10-20

## 2021-10-20 RX ORDER — ROCURONIUM BROMIDE 10 MG/ML
INJECTION, SOLUTION INTRAVENOUS PRN
Status: DISCONTINUED | OUTPATIENT
Start: 2021-10-20 | End: 2021-10-20 | Stop reason: SURG

## 2021-10-20 RX ORDER — LIDOCAINE HYDROCHLORIDE 20 MG/ML
INJECTION, SOLUTION EPIDURAL; INFILTRATION; INTRACAUDAL; PERINEURAL PRN
Status: DISCONTINUED | OUTPATIENT
Start: 2021-10-20 | End: 2021-10-20 | Stop reason: SURG

## 2021-10-20 RX ORDER — ENEMA 19; 7 G/133ML; G/133ML
1 ENEMA RECTAL
Status: DISCONTINUED | OUTPATIENT
Start: 2021-10-20 | End: 2021-10-21 | Stop reason: HOSPADM

## 2021-10-20 RX ORDER — OXYCODONE HYDROCHLORIDE 10 MG/1
10 TABLET ORAL
Status: DISCONTINUED | OUTPATIENT
Start: 2021-10-20 | End: 2021-10-21 | Stop reason: HOSPADM

## 2021-10-20 RX ORDER — TRAMADOL HYDROCHLORIDE 50 MG/1
50 TABLET ORAL EVERY 4 HOURS PRN
Status: DISCONTINUED | OUTPATIENT
Start: 2021-10-20 | End: 2021-10-21 | Stop reason: HOSPADM

## 2021-10-20 RX ORDER — CEFAZOLIN SODIUM IN 0.9 % NACL 2 G/100 ML
2 PLASTIC BAG, INJECTION (ML) INTRAVENOUS ONCE
Status: COMPLETED | OUTPATIENT
Start: 2021-10-20 | End: 2021-10-20

## 2021-10-20 RX ORDER — SCOLOPAMINE TRANSDERMAL SYSTEM 1 MG/1
1 PATCH, EXTENDED RELEASE TRANSDERMAL
Status: DISCONTINUED | OUTPATIENT
Start: 2021-10-20 | End: 2021-10-21 | Stop reason: HOSPADM

## 2021-10-20 RX ORDER — DIPHENHYDRAMINE HYDROCHLORIDE 50 MG/ML
25 INJECTION INTRAMUSCULAR; INTRAVENOUS EVERY 6 HOURS PRN
Status: DISCONTINUED | OUTPATIENT
Start: 2021-10-20 | End: 2021-10-21 | Stop reason: HOSPADM

## 2021-10-20 RX ORDER — DEXAMETHASONE SODIUM PHOSPHATE 4 MG/ML
6 INJECTION, SOLUTION INTRA-ARTICULAR; INTRALESIONAL; INTRAMUSCULAR; INTRAVENOUS; SOFT TISSUE ONCE
Status: COMPLETED | OUTPATIENT
Start: 2021-10-21 | End: 2021-10-21

## 2021-10-20 RX ORDER — HYDRALAZINE HYDROCHLORIDE 20 MG/ML
5 INJECTION INTRAMUSCULAR; INTRAVENOUS
Status: DISCONTINUED | OUTPATIENT
Start: 2021-10-20 | End: 2021-10-20 | Stop reason: HOSPADM

## 2021-10-20 RX ORDER — KETOROLAC TROMETHAMINE 30 MG/ML
INJECTION, SOLUTION INTRAMUSCULAR; INTRAVENOUS
Status: DISCONTINUED | OUTPATIENT
Start: 2021-10-20 | End: 2021-10-20 | Stop reason: HOSPADM

## 2021-10-20 RX ORDER — OXYCODONE HYDROCHLORIDE 5 MG/1
5 TABLET ORAL
Status: DISCONTINUED | OUTPATIENT
Start: 2021-10-20 | End: 2021-10-21 | Stop reason: HOSPADM

## 2021-10-20 RX ORDER — DIPHENHYDRAMINE HCL 25 MG
25 TABLET ORAL NIGHTLY PRN
Status: DISCONTINUED | OUTPATIENT
Start: 2021-10-21 | End: 2021-10-21 | Stop reason: HOSPADM

## 2021-10-20 RX ORDER — OXYCODONE HCL 5 MG/5 ML
5 SOLUTION, ORAL ORAL
Status: COMPLETED | OUTPATIENT
Start: 2021-10-20 | End: 2021-10-20

## 2021-10-20 RX ORDER — DEXAMETHASONE SODIUM PHOSPHATE 4 MG/ML
4 INJECTION, SOLUTION INTRA-ARTICULAR; INTRALESIONAL; INTRAMUSCULAR; INTRAVENOUS; SOFT TISSUE
Status: DISCONTINUED | OUTPATIENT
Start: 2021-10-20 | End: 2021-10-21 | Stop reason: HOSPADM

## 2021-10-20 RX ORDER — ATORVASTATIN CALCIUM 40 MG/1
40 TABLET, FILM COATED ORAL DAILY
Status: DISCONTINUED | OUTPATIENT
Start: 2021-10-20 | End: 2021-10-21 | Stop reason: HOSPADM

## 2021-10-20 RX ORDER — HYDROMORPHONE HYDROCHLORIDE 1 MG/ML
0.4 INJECTION, SOLUTION INTRAMUSCULAR; INTRAVENOUS; SUBCUTANEOUS
Status: DISCONTINUED | OUTPATIENT
Start: 2021-10-20 | End: 2021-10-20 | Stop reason: HOSPADM

## 2021-10-20 RX ORDER — CEFAZOLIN SODIUM 1 G/3ML
INJECTION, POWDER, FOR SOLUTION INTRAMUSCULAR; INTRAVENOUS PRN
Status: DISCONTINUED | OUTPATIENT
Start: 2021-10-20 | End: 2021-10-20 | Stop reason: SURG

## 2021-10-20 RX ORDER — HALOPERIDOL 5 MG/ML
1 INJECTION INTRAMUSCULAR EVERY 6 HOURS PRN
Status: DISCONTINUED | OUTPATIENT
Start: 2021-10-20 | End: 2021-10-21 | Stop reason: HOSPADM

## 2021-10-20 RX ORDER — MAGNESIUM SULFATE HEPTAHYDRATE 500 MG/ML
INJECTION, SOLUTION INTRAMUSCULAR; INTRAVENOUS PRN
Status: DISCONTINUED | OUTPATIENT
Start: 2021-10-20 | End: 2021-10-20 | Stop reason: SURG

## 2021-10-20 RX ORDER — SODIUM CHLORIDE 9 MG/ML
INJECTION, SOLUTION INTRAMUSCULAR; INTRAVENOUS; SUBCUTANEOUS
Status: DISCONTINUED | OUTPATIENT
Start: 2021-10-20 | End: 2021-10-20 | Stop reason: HOSPADM

## 2021-10-20 RX ORDER — KETOROLAC TROMETHAMINE 30 MG/ML
15 INJECTION, SOLUTION INTRAMUSCULAR; INTRAVENOUS EVERY 6 HOURS
Status: DISCONTINUED | OUTPATIENT
Start: 2021-10-20 | End: 2021-10-21 | Stop reason: HOSPADM

## 2021-10-20 RX ORDER — SODIUM CHLORIDE, SODIUM LACTATE, POTASSIUM CHLORIDE, CALCIUM CHLORIDE 600; 310; 30; 20 MG/100ML; MG/100ML; MG/100ML; MG/100ML
INJECTION, SOLUTION INTRAVENOUS CONTINUOUS
Status: ACTIVE | OUTPATIENT
Start: 2021-10-20 | End: 2021-10-20

## 2021-10-20 RX ORDER — MEPERIDINE HYDROCHLORIDE 25 MG/ML
12.5 INJECTION INTRAMUSCULAR; INTRAVENOUS; SUBCUTANEOUS
Status: DISCONTINUED | OUTPATIENT
Start: 2021-10-20 | End: 2021-10-20 | Stop reason: HOSPADM

## 2021-10-20 RX ORDER — HYDROMORPHONE HYDROCHLORIDE 1 MG/ML
0.5 INJECTION, SOLUTION INTRAMUSCULAR; INTRAVENOUS; SUBCUTANEOUS
Status: DISCONTINUED | OUTPATIENT
Start: 2021-10-20 | End: 2021-10-21 | Stop reason: HOSPADM

## 2021-10-20 RX ORDER — HALOPERIDOL 5 MG/ML
1 INJECTION INTRAMUSCULAR
Status: DISCONTINUED | OUTPATIENT
Start: 2021-10-20 | End: 2021-10-20 | Stop reason: HOSPADM

## 2021-10-20 RX ORDER — HYDROMORPHONE HYDROCHLORIDE 1 MG/ML
0.2 INJECTION, SOLUTION INTRAMUSCULAR; INTRAVENOUS; SUBCUTANEOUS
Status: DISCONTINUED | OUTPATIENT
Start: 2021-10-20 | End: 2021-10-20 | Stop reason: HOSPADM

## 2021-10-20 RX ORDER — DEXAMETHASONE SODIUM PHOSPHATE 4 MG/ML
INJECTION, SOLUTION INTRA-ARTICULAR; INTRALESIONAL; INTRAMUSCULAR; INTRAVENOUS; SOFT TISSUE PRN
Status: DISCONTINUED | OUTPATIENT
Start: 2021-10-20 | End: 2021-10-20 | Stop reason: SURG

## 2021-10-20 RX ORDER — AMOXICILLIN 250 MG
1 CAPSULE ORAL
Status: DISCONTINUED | OUTPATIENT
Start: 2021-10-20 | End: 2021-10-21 | Stop reason: HOSPADM

## 2021-10-20 RX ORDER — ACETAMINOPHEN 500 MG
1000 TABLET ORAL EVERY 6 HOURS PRN
Status: DISCONTINUED | OUTPATIENT
Start: 2021-10-25 | End: 2021-10-21 | Stop reason: HOSPADM

## 2021-10-20 RX ORDER — DIPHENHYDRAMINE HYDROCHLORIDE 50 MG/ML
6.25 INJECTION INTRAMUSCULAR; INTRAVENOUS
Status: DISCONTINUED | OUTPATIENT
Start: 2021-10-20 | End: 2021-10-20 | Stop reason: HOSPADM

## 2021-10-20 RX ORDER — BISACODYL 10 MG
10 SUPPOSITORY, RECTAL RECTAL
Status: DISCONTINUED | OUTPATIENT
Start: 2021-10-20 | End: 2021-10-21 | Stop reason: HOSPADM

## 2021-10-20 RX ORDER — ACETAMINOPHEN 500 MG
1000 TABLET ORAL EVERY 6 HOURS PRN
Status: DISCONTINUED | OUTPATIENT
Start: 2021-10-20 | End: 2021-10-20 | Stop reason: HOSPADM

## 2021-10-20 RX ORDER — DOCUSATE SODIUM 100 MG/1
100 CAPSULE, LIQUID FILLED ORAL 2 TIMES DAILY
Status: DISCONTINUED | OUTPATIENT
Start: 2021-10-20 | End: 2021-10-21 | Stop reason: HOSPADM

## 2021-10-20 RX ORDER — SODIUM CHLORIDE, SODIUM LACTATE, POTASSIUM CHLORIDE, CALCIUM CHLORIDE 600; 310; 30; 20 MG/100ML; MG/100ML; MG/100ML; MG/100ML
INJECTION, SOLUTION INTRAVENOUS CONTINUOUS
Status: DISCONTINUED | OUTPATIENT
Start: 2021-10-20 | End: 2021-10-20 | Stop reason: HOSPADM

## 2021-10-20 RX ORDER — POLYETHYLENE GLYCOL 3350 17 G/17G
1 POWDER, FOR SOLUTION ORAL 2 TIMES DAILY PRN
Status: DISCONTINUED | OUTPATIENT
Start: 2021-10-20 | End: 2021-10-21 | Stop reason: HOSPADM

## 2021-10-20 RX ORDER — HYDROMORPHONE HYDROCHLORIDE 1 MG/ML
0.1 INJECTION, SOLUTION INTRAMUSCULAR; INTRAVENOUS; SUBCUTANEOUS
Status: DISCONTINUED | OUTPATIENT
Start: 2021-10-20 | End: 2021-10-20 | Stop reason: HOSPADM

## 2021-10-20 RX ORDER — HYDROMORPHONE HYDROCHLORIDE 2 MG/ML
INJECTION, SOLUTION INTRAMUSCULAR; INTRAVENOUS; SUBCUTANEOUS PRN
Status: DISCONTINUED | OUTPATIENT
Start: 2021-10-20 | End: 2021-10-20 | Stop reason: SURG

## 2021-10-20 RX ADMIN — SUGAMMADEX 200 MG: 100 INJECTION, SOLUTION INTRAVENOUS at 08:24

## 2021-10-20 RX ADMIN — LIDOCAINE HYDROCHLORIDE 60 MG: 20 INJECTION, SOLUTION EPIDURAL; INFILTRATION; INTRACAUDAL; PERINEURAL at 07:06

## 2021-10-20 RX ADMIN — TRANEXAMIC ACID 1000 MG: 100 INJECTION, SOLUTION INTRAVENOUS at 07:10

## 2021-10-20 RX ADMIN — HYDROMORPHONE HYDROCHLORIDE 0.5 MG: 2 INJECTION, SOLUTION INTRAMUSCULAR; INTRAVENOUS; SUBCUTANEOUS at 07:32

## 2021-10-20 RX ADMIN — ACETAMINOPHEN 1000 MG: 500 TABLET ORAL at 17:33

## 2021-10-20 RX ADMIN — FENTANYL CITRATE 100 MCG: 50 INJECTION, SOLUTION INTRAMUSCULAR; INTRAVENOUS at 07:06

## 2021-10-20 RX ADMIN — SODIUM CHLORIDE, POTASSIUM CHLORIDE, SODIUM LACTATE AND CALCIUM CHLORIDE: 600; 310; 30; 20 INJECTION, SOLUTION INTRAVENOUS at 08:08

## 2021-10-20 RX ADMIN — ACETAMINOPHEN 1000 MG: 500 TABLET ORAL at 12:59

## 2021-10-20 RX ADMIN — ACETAMINOPHEN 1000 MG: 500 TABLET ORAL at 23:48

## 2021-10-20 RX ADMIN — TRANEXAMIC ACID 1000 MG: 100 INJECTION, SOLUTION INTRAVENOUS at 08:03

## 2021-10-20 RX ADMIN — SODIUM CHLORIDE, POTASSIUM CHLORIDE, SODIUM LACTATE AND CALCIUM CHLORIDE: 600; 310; 30; 20 INJECTION, SOLUTION INTRAVENOUS at 06:33

## 2021-10-20 RX ADMIN — DEXAMETHASONE SODIUM PHOSPHATE 8 MG: 4 INJECTION, SOLUTION INTRAMUSCULAR; INTRAVENOUS at 07:12

## 2021-10-20 RX ADMIN — CEFAZOLIN 2 G: 330 INJECTION, POWDER, FOR SOLUTION INTRAMUSCULAR; INTRAVENOUS at 07:06

## 2021-10-20 RX ADMIN — KETOROLAC TROMETHAMINE 15 MG: 30 INJECTION, SOLUTION INTRAMUSCULAR at 23:48

## 2021-10-20 RX ADMIN — ROCURONIUM BROMIDE 50 MG: 10 INJECTION, SOLUTION INTRAVENOUS at 07:06

## 2021-10-20 RX ADMIN — CEFAZOLIN 2 G: 1 INJECTION, POWDER, FOR SOLUTION INTRAVENOUS at 10:59

## 2021-10-20 RX ADMIN — KETOROLAC TROMETHAMINE 15 MG: 30 INJECTION, SOLUTION INTRAMUSCULAR at 13:00

## 2021-10-20 RX ADMIN — OXYCODONE HYDROCHLORIDE 10 MG: 5 SOLUTION ORAL at 08:59

## 2021-10-20 RX ADMIN — DOCUSATE SODIUM 100 MG: 100 CAPSULE, LIQUID FILLED ORAL at 17:33

## 2021-10-20 RX ADMIN — LIDOCAINE HYDROCHLORIDE 4 ML: 40 SOLUTION TOPICAL at 07:06

## 2021-10-20 RX ADMIN — KETOROLAC TROMETHAMINE 15 MG: 30 INJECTION, SOLUTION INTRAMUSCULAR at 17:33

## 2021-10-20 RX ADMIN — ASPIRIN 81 MG: 81 TABLET, COATED ORAL at 20:37

## 2021-10-20 RX ADMIN — HYDROMORPHONE HYDROCHLORIDE 0.5 MG: 2 INJECTION, SOLUTION INTRAMUSCULAR; INTRAVENOUS; SUBCUTANEOUS at 07:25

## 2021-10-20 RX ADMIN — SENNOSIDES AND DOCUSATE SODIUM 1 TABLET: 50; 8.6 TABLET ORAL at 20:37

## 2021-10-20 RX ADMIN — ONDANSETRON 4 MG: 2 INJECTION INTRAMUSCULAR; INTRAVENOUS at 08:06

## 2021-10-20 RX ADMIN — DULOXETINE HYDROCHLORIDE 30 MG: 30 CAPSULE, DELAYED RELEASE ORAL at 17:33

## 2021-10-20 RX ADMIN — PROPOFOL 180 MG: 10 INJECTION, EMULSION INTRAVENOUS at 07:06

## 2021-10-20 RX ADMIN — MAGNESIUM SULFATE HEPTAHYDRATE 2 G: 500 INJECTION, SOLUTION INTRAMUSCULAR; INTRAVENOUS at 07:27

## 2021-10-20 RX ADMIN — ATORVASTATIN CALCIUM 40 MG: 40 TABLET, FILM COATED ORAL at 12:59

## 2021-10-20 ASSESSMENT — COGNITIVE AND FUNCTIONAL STATUS - GENERAL
TURNING FROM BACK TO SIDE WHILE IN FLAT BAD: A LITTLE
DRESSING REGULAR LOWER BODY CLOTHING: A LOT
CLIMB 3 TO 5 STEPS WITH RAILING: A LITTLE
TOILETING: A LITTLE
SUGGESTED CMS G CODE MODIFIER DAILY ACTIVITY: CK
EATING MEALS: A LITTLE
SUGGESTED CMS G CODE MODIFIER MOBILITY: CK
MOVING TO AND FROM BED TO CHAIR: A LITTLE
DRESSING REGULAR UPPER BODY CLOTHING: A LITTLE
WALKING IN HOSPITAL ROOM: A LITTLE
MOVING FROM LYING ON BACK TO SITTING ON SIDE OF FLAT BED: A LITTLE
HELP NEEDED FOR BATHING: A LOT
MOBILITY SCORE: 18
STANDING UP FROM CHAIR USING ARMS: A LITTLE
DAILY ACTIVITIY SCORE: 16
PERSONAL GROOMING: A LITTLE

## 2021-10-20 ASSESSMENT — LIFESTYLE VARIABLES
ON A TYPICAL DAY WHEN YOU DRINK ALCOHOL HOW MANY DRINKS DO YOU HAVE: 0
EVER HAD A DRINK FIRST THING IN THE MORNING TO STEADY YOUR NERVES TO GET RID OF A HANGOVER: NO
EVER FELT BAD OR GUILTY ABOUT YOUR DRINKING: NO
TOTAL SCORE: 0
TOTAL SCORE: 0
CONSUMPTION TOTAL: NEGATIVE
ALCOHOL_USE: NO
AVERAGE NUMBER OF DAYS PER WEEK YOU HAVE A DRINK CONTAINING ALCOHOL: 0
HOW MANY TIMES IN THE PAST YEAR HAVE YOU HAD 5 OR MORE DRINKS IN A DAY: 0
TOTAL SCORE: 0
HAVE PEOPLE ANNOYED YOU BY CRITICIZING YOUR DRINKING: NO
HAVE YOU EVER FELT YOU SHOULD CUT DOWN ON YOUR DRINKING: NO

## 2021-10-20 ASSESSMENT — PATIENT HEALTH QUESTIONNAIRE - PHQ9
1. LITTLE INTEREST OR PLEASURE IN DOING THINGS: NOT AT ALL
SUM OF ALL RESPONSES TO PHQ9 QUESTIONS 1 AND 2: 0
2. FEELING DOWN, DEPRESSED, IRRITABLE, OR HOPELESS: NOT AT ALL

## 2021-10-20 ASSESSMENT — PAIN DESCRIPTION - PAIN TYPE
TYPE: SURGICAL PAIN
TYPE: ACUTE PAIN
TYPE: SURGICAL PAIN
TYPE: SURGICAL PAIN

## 2021-10-20 ASSESSMENT — FIBROSIS 4 INDEX: FIB4 SCORE: 1.24

## 2021-10-20 NOTE — OR NURSING
1020-pt resting quietly. Continued on oxymask at 10L per ERAS.     1032-report back to Soila GREEN.

## 2021-10-20 NOTE — ANESTHESIA PREPROCEDURE EVALUATION
Relevant Problems   CARDIAC   (positive) Pain due to varicose veins of both lower extremities   (positive) Peripheral venous insufficiency       Physical Exam    Airway   Mallampati: II  TM distance: >3 FB  Neck ROM: full       Cardiovascular - normal exam  Rhythm: regular  Rate: normal  (-) murmur     Dental - normal exam           Pulmonary - normal exam  Breath sounds clear to auscultation     Abdominal    Neurological - normal exam                 Anesthesia Plan    ASA 2       Plan - general       Airway plan will be ETT          Induction: intravenous    Postoperative Plan: Postoperative administration of opioids is intended.    Pertinent diagnostic labs and testing reviewed    Informed Consent:    Anesthetic plan and risks discussed with patient.    Use of blood products discussed with: patient whom consented to blood products.

## 2021-10-20 NOTE — OR NURSING
0540: Brought patient back to pre-op and assumed care.  0635:  Patient allergies and NPO status verified, home medication reconciliation completed and belongings secured. Patient verbalizes understanding of pain scale, expected course of stay and plan of care. Surgical site verified with patient. IV access established. Sequentials placed on legs.

## 2021-10-20 NOTE — PROGRESS NOTES
COVID-19 surge in effect    Patient arrived from PACU admitted to room 213-2. Pt is alert and oriented x 4, pain is 1/10 on her right hip, patient walk to the bathroom after arrival to void using FWW, dressing CDI with slight drainage from surgery, CMS intact, dorsiflex and plantar flex both feet, cap refill less than 3 seconds, palpable pulses BLE, unlabored breathing, denies SOB, SCD's and polar ice in use, oriented to use of call light and importance of calling for assistance, bed in lowest position, call light within reach, updated on plan of care.    Patient said that she is staying overnight for observation according to Dr. Otto. Patients FWW at bedside. No need to supply walker.

## 2021-10-20 NOTE — DISCHARGE PLANNING
Anticipated Discharge Disposition:   Home when medically cleared     Action:   Chart review complete.     This patient was admitted for a right JACOBO. Per case management note from 10/12, this patient has a FWW and shower chair at home. No equiptment needs.    PT/OT pending. CM will continue to follow for needs.     Barriers to Discharge:   Medical Clearance    Plan:   Hospital care management will continue to assist with discharge planning needs.

## 2021-10-20 NOTE — ANESTHESIA POSTPROCEDURE EVALUATION
Patient: Garima Banda    Procedure Summary     Date: 10/20/21 Room / Location:  OR  / SURGERY [facility]    Anesthesia Start: 0702 Anesthesia Stop: 0836    Procedure: ARTHROPLASTY, HIP, TOTAL (Right Hip) Diagnosis:       Arthritis of right hip      Right hip pain      (Arthritis of right hip [M16.11]  Right hip pain [M25.551])    Surgeons: Chavo Otto M.D. Responsible Provider: Darrell Payne M.D.    Anesthesia Type: general ASA Status: 2          Final Anesthesia Type: general  Last vitals  BP   Blood Pressure : 147/89    Temp   36.8 °C (98.2 °F)    Pulse   69   Resp   14    SpO2   99 %      Anesthesia Post Evaluation    Patient location during evaluation: PACU  Patient participation: complete - patient participated  Level of consciousness: awake and alert    Airway patency: patent  Anesthetic complications: no  Cardiovascular status: hemodynamically stable  Respiratory status: acceptable  Hydration status: euvolemic    PONV: none          No complications documented.     Nurse Pain Score: 5 (NPRS)

## 2021-10-20 NOTE — OP REPORT
Operative Report    Date: 10/20/2021    Surgeon: Chavo Otto M.D.    [facility]istant[facility]    Anesthesiologist: Darrell Payne M.D.     Pre-operative Diagnosis: Degenerative Arthritisarthritis right hip    Post-operative Diagnosis: Degenerative Arthritis    Procedure: Right total hip arthroplasty    EBL: 50    Implants: [employer] size 50 R3 acetabulum with a neutral 32 mm polyethylene liner, and a [employer] size 3 standard offset anthology stem with a 32 mm +0 Oxinium head    Indications: Patient is an 71 y.o.-year-old female with longstanding pain in the right hip secondary toDegenerative Arthritis arthritis.  A variety of nonsurgical treatments have been tried that include activity modification, over-the-counter medications, and therapy for strengthening and stretching the muscles.  The pain is now to the point of limiting the activities of daily living, interfering with enjoyable activities, and not responding to nonsurgical methods of treatment.  On physical examination the righthip has painful and limited range of motion.   X-ray views of the right hip show advanced degenerative arthritis with subchondral sclerosis, peripheral osteophytes, and joint space narrowing.  The patient is therefore a candidate for a right total hip replacement.      Findings: Severe degenerative disease    Procedure in detail: The patient was brought to the operating room and anesthesia was administered.  Antibiotics and tranexamic acid given IV.  Leg lengths were measured in the supine position and the operative leg was approximately 1mmshort.  The patient was then placed in the left lateral decubitus position on a well-padded pegboard..  An axillary roll was placed, and patient secured with well-padded pegs.  Leg lengths were then measured in this position and a timeout was called.    A standard anterolateral incision was made starting at the front of the trochanter and extending up to just distal and  lateral to the anterior superior iliac spine.  Dissection was carried sharply through skin and subcutaneous tissues and bleeders stopped with electrocautery.  The fascia was opened just in front of the greater trochanter and then proximally along the anterior margin of the gluteus medius muscle.  The interval between the gluteus medius and tensor fascia was developed.  Vessels were coagulated and the superior gluteal nerve was mobilized proximally and protected.    The capsule was exposed, cleared and a capsulotomy was made.  A subcapital femoral cut was made  in the plane of the acetabular opening.  The proximal femur was then delivered into the wound with extension and external rotation.  A secondary cut at the base of the femoral neck was made using appropriate cutting guide.  The femoral neck segment was removed.    Release of the capsule and obturator internus as needed was made to gain adequate exposure to the proximal femur.  The standard instrumentation was then used to broach the femoral canal.  Care was taken to ensure we were within the intramedullary canal.  We were able to broach up to a size 3femoral stem which had excellent axial and rotational stability.    The acetabulum was then exposed and the femoral head was removed.  The labrum, ligamentum teres, and capsule were excised enough for visualization.  We then reamed down to the inner table, and then outward until we had good hemispherical bleeding bone with a size 50reamer.  The size50 cup was then placed with excellent press-fit.  2 dome screws were placed with excellent purchase.  Osteophytes were removed as needed to avoid impingement. The liner was then locked into position.    Trialing was then carried out with the size 3femoral component and the standardoffset neck.  The  0head had the best length and stability characteristics.  Care was taken to ensure the most stable hip that most closely equalized the patient's leg lengths.    We then  press-fit the real size 3femoral stem with standardoffset neck   After final trialing we selected the 0ceramic head to achieve our goals of stability followed by length.  Final reduction was done after all debris had been cleared from the hip joint.  Final stability check was done.    The wound was then soaked with Betadine in patients without Betadine allergy.  We then flushed the wound thoroughly with saline.  The capsule was closed with interrupted #1 Vicryl.  The fascia was then closed with interrupted #1 Vicryl and the hip joint region injected with local anesthetic.  The skin was closed with layers of 2-0 pop-off Vicryl and a running 3-0 Monocryl.  A Acticoat with CASH  dressing was placed.  Patient was stable during the procedure and at the time of this dictation.      Dispo: PACU - hemodynamically stable.

## 2021-10-20 NOTE — H&P
"Surgery Orthopedic History & Physical Note    Date  10/20/2021    Primary Care Physician  Tia Saunders M.D.    CC  Pre-Op Diagnosis Codes:     * Arthritis of right hip [M16.11]     * Right hip pain [M25.551]    HPI  This is a 71 y.o. female who presented with severe pain in the right hip secondary to degenerative arthritis.  Nonsurgical treatment methods are no longer providing adequate relief.  She is coming in today for a right total hip replacement.    Past Medical History:   Diagnosis Date   • Arthritis     osteo arthritis   • Depression    • Elevated liver enzymes     hx of   • Hay fever    • High cholesterol    • Hyperlipidemia    • Low blood pressure     \"no need for medications according to doctors\"   • Pain     back/hips   • Sleep apnea     suspected, no sleep study, CLAIRE score of 4   • Urinary incontinence        Past Surgical History:   Procedure Laterality Date   • OTHER      trigger finger R; R radial fracture repair no hardare (pins taken out), CTR b/l        Current Facility-Administered Medications   Medication Dose Route Frequency Provider Last Rate Last Admin   • Pharmacy Consult Request - Initiate Total Joint Antibiotic Protocol Per Pharmacy   Other PHARMACY TO DOSE Chavo Otto M.D.       • Tranexamic Acid (CYKLOKAPRON) 1,000 mg in  mL IVPB  1,000 mg Intravenous Once Chavo Otto M.D.       • ceFAZolin (ANCEF) 2 g in  mL IVPB premix  2 g Intravenous Once Chavo Otto M.D.       • lactated ringers infusion   Intravenous Continuous Chavo Otto M.D.           Social History     Socioeconomic History   • Marital status: Single     Spouse name: Not on file   • Number of children: Not on file   • Years of education: Not on file   • Highest education level: Not on file   Occupational History   • Not on file   Tobacco Use   • Smoking status: Never Smoker   • Smokeless tobacco: Never Used   • Tobacco comment: second hand smoke casinos x 28 years   Vaping Use   • Vaping Use: Never " used   Substance and Sexual Activity   • Alcohol use: Yes     Comment: 1 drink per week   • Drug use: Yes     Types: Inhaled     Comment: jai (3x per year)   • Sexual activity: Not on file   Other Topics Concern   • Not on file   Social History Narrative   • Not on file     Social Determinants of Health     Financial Resource Strain:    • Difficulty of Paying Living Expenses:    Food Insecurity:    • Worried About Running Out of Food in the Last Year:    • Ran Out of Food in the Last Year:    Transportation Needs:    • Lack of Transportation (Medical):    • Lack of Transportation (Non-Medical):    Physical Activity:    • Days of Exercise per Week:    • Minutes of Exercise per Session:    Stress:    • Feeling of Stress :    Social Connections:    • Frequency of Communication with Friends and Family:    • Frequency of Social Gatherings with Friends and Family:    • Attends Islam Services:    • Active Member of Clubs or Organizations:    • Attends Club or Organization Meetings:    • Marital Status:    Intimate Partner Violence:    • Fear of Current or Ex-Partner:    • Emotionally Abused:    • Physically Abused:    • Sexually Abused:        Family History   Problem Relation Age of Onset   • Cancer Mother         breast. lung   • Cancer Father         colon   • Diabetes Brother        Allergies  Gabapentin    Review of Systems  Negative    Physical Exam  Constitutional:       Appearance: Normal appearance.   Eyes:      Extraocular Movements: Extraocular movements intact.   Cardiovascular:      Rate and Rhythm: Normal rate and regular rhythm.      Pulses: Normal pulses.   Pulmonary:      Effort: Pulmonary effort is normal.   Skin:     Capillary Refill: Capillary refill takes more than 3 seconds.      Comments: She has limited range of motion and pain in the right hip   Neurological:      General: No focal deficit present.      Mental Status: She is alert.   Psychiatric:         Mood and Affect: Mood normal.           Vital Signs  Blood Pressure : 124/78   Temperature: 36.7 °C (98.1 °F)   Pulse: 84   Respiration: 16   Pulse Oximetry: 93 %       Labs:                    Radiology:  No orders to display     X-rays of the right hip show advanced degenerative change    Assessment/Plan:  Pre-Op Diagnosis Codes:     * Arthritis of right hip [M16.11]     * Right hip pain [M25.551]  Procedure(s):  ARTHROPLASTY, HIP, TOTAL patient understands the risks and goals of the surgery

## 2021-10-20 NOTE — OR NURSING
0834: To PACU from OR via gurney, drowsy but awake and denies pain, respirations spontaneous and non-labored. Icepack applied over c/d/i R hip surgical dressings. R DP +2.  0845: No change, O2 increased to 10 LPM per ERAS orders. Pt placed on bedpan for urge to void.  0900: Xrays in progress, medicated for onset surgical pain. Pt aware of potential for extended PACU stay until floor room available. Unable to void as yet, bedpan removed.  0915: Dozing - not roused at this time to allow onset po analgesia.  0930: Pt states pain now at tolerable level. Drinking po juice per ERAS orders.  0934: No change in surgical site assessment.Meets criteria to transfer to floor. Awaiting room assignment.  1000: Sleeping - not roused at this time  1017: Report to Elle DEWITT RN  1032: Care resumed of pt who rouses easily and sips on juice.   1130: Rouses easily but sleeps when not stimulated.  1217: No change in surgical site assessment. Transferred on O2 tank @ 298 L

## 2021-10-20 NOTE — LETTER
October 11, 2021    Patient Name: Garima Banda  Surgeon Name: Chavo Otto M.D.  Surgery Facility: [facility] ([id number] [facility])  Surgery Date: 10/20/2021    The time of your surgery is not final and may change up to and until the day of your surgery. You will be contacted 24-48 hours prior to your surgery date with your check-in and surgery time.    If you will not be at one of the below numbers please call/text the surgery scheduler at [phone]  Preferred Phone: [phone]    BEFORE YOUR SURGERY  Pre Registration and/or Lab Work must be done within and no earlier than 28 days prior to your surgery date. Please call [facility] at [phone] for an appointment as soon as possible.       Please refrain from smoking any substance after midnight prior to surgery. Smoking may interfere with the anesthetic and frequently produces nausea during the recovery period.    Continue taking all lifesaving medications. Including the morning of your surgery with small sip of water.    Please read the MEDICATION INSTRUCTIONS below completely.    DAY OF YOUR SURGERY  Nothing to eat or drink after midnight     Please arrive at the hospital/surgery center at the check-in time provided.     An adult will need to bring you and take you home after your surgery.     AFTER YOUR SURGERY  Post op Appointment:   Date: 11/03/2021   Time: 1:00PM   With:Chavo Otto M.D.     Location: [address] [place] [address]    - No dental work for 3-6 months after your surgery.        TIME OFF WORK  FMLA or Disability forms can be faxed directly to: [phone] or you may drop them off at [address] [place] [address]. Our office charges a $35.00 fee per form. Forms will be completed within 10 business days of drop off and payment received. For the status of your forms you may contact our disability office directly [phone].    MEDICATION INSTRUCTIONS  The  following medications should be stopped a minimum of 10 days prior to surgery:  All over the counter, Supplements & Herbal medications    Anorectics: Phentermine (Adipex-P, Lomaira and Suprenza), Phentermine-topiramate (Qsymia), Bupropion-naltrexone (Contrave)    Opiod Partial Agonists/Opioid Antagonists: Buprenorphine (Subocone, Belbuca, Butrans, Probuphine Implant, Sublocade), Naltrexone (ReVia, Vivitrol), Naloxone    Amphetamines: Dextroamphetamine/Amphetamine (Adderall, Mydayis), Methylphenidate Hydrochloride (Concerta, Metadate, Methylin, Ritalin)    The following medications should be stopped 5 days prior to surgery:  Blood Thinners: Any Aspirin, Aspirin products, anti-inflammatories such as ibuprofen and any blood thinners such as Coumadin and Plavix. Please consult your prescribing physician if you are on life saving blood thinners, in regards to when to stop medications prior to surgery.     The following medications should be stopped a minimum of 3 days prior to surgery:  PDE-5 inhibitors: Sildenafil (Viagra), Tadalafil (Cialis), Vardenafil (Levitra), Avanafil (Stendra)    MAO Inhibitors: Rasagiline (Azilect), Selegiline (Eldepryl, Emsam, Selapar), Isocarboxazid (Marplan), Phenelzine (Nardil)

## 2021-10-20 NOTE — ANESTHESIA TIME REPORT
Anesthesia Start and Stop Event Times     Date Time Event    10/20/2021 0641 Ready for Procedure     0702 Anesthesia Start     0836 Anesthesia Stop        Responsible Staff  10/20/21    Name Role Begin End    Darrell Payne M.D. Anesth 0702 0836        Preop Diagnosis (Free Text):  Pre-op Diagnosis     Arthritis of right hip [M16.11]  Right hip pain [M25.551]        Preop Diagnosis (Codes):  Diagnosis Information     Diagnosis Code(s): Arthritis of right hip [M16.11]     Right hip pain [M25.551]        Premium Reason  Non-Premium    Comments:

## 2021-10-21 VITALS
OXYGEN SATURATION: 96 % | BODY MASS INDEX: 30.52 KG/M2 | TEMPERATURE: 97.7 F | RESPIRATION RATE: 18 BRPM | HEART RATE: 79 BPM | DIASTOLIC BLOOD PRESSURE: 58 MMHG | SYSTOLIC BLOOD PRESSURE: 100 MMHG | WEIGHT: 178.79 LBS | HEIGHT: 64 IN

## 2021-10-21 PROBLEM — G89.18 POSTOPERATIVE PAIN: Status: ACTIVE | Noted: 2021-10-21

## 2021-10-21 PROCEDURE — 96375 TX/PRO/DX INJ NEW DRUG ADDON: CPT

## 2021-10-21 PROCEDURE — 700111 HCHG RX REV CODE 636 W/ 250 OVERRIDE (IP): Performed by: ORTHOPAEDIC SURGERY

## 2021-10-21 PROCEDURE — 97535 SELF CARE MNGMENT TRAINING: CPT

## 2021-10-21 PROCEDURE — 96376 TX/PRO/DX INJ SAME DRUG ADON: CPT

## 2021-10-21 PROCEDURE — 97165 OT EVAL LOW COMPLEX 30 MIN: CPT

## 2021-10-21 PROCEDURE — 97162 PT EVAL MOD COMPLEX 30 MIN: CPT

## 2021-10-21 PROCEDURE — A9270 NON-COVERED ITEM OR SERVICE: HCPCS | Performed by: ORTHOPAEDIC SURGERY

## 2021-10-21 PROCEDURE — G0378 HOSPITAL OBSERVATION PER HR: HCPCS

## 2021-10-21 PROCEDURE — 94760 N-INVAS EAR/PLS OXIMETRY 1: CPT

## 2021-10-21 PROCEDURE — 700102 HCHG RX REV CODE 250 W/ 637 OVERRIDE(OP): Performed by: ORTHOPAEDIC SURGERY

## 2021-10-21 RX ORDER — TRAMADOL HYDROCHLORIDE 50 MG/1
50 TABLET ORAL EVERY 6 HOURS PRN
Qty: 40 TABLET | Refills: 0 | Status: SHIPPED | OUTPATIENT
Start: 2021-10-21 | End: 2021-10-31

## 2021-10-21 RX ORDER — OXYCODONE HYDROCHLORIDE 5 MG/1
5-10 TABLET ORAL EVERY 4 HOURS PRN
Qty: 30 TABLET | Refills: 0 | Status: SHIPPED | OUTPATIENT
Start: 2021-10-21 | End: 2021-10-28

## 2021-10-21 RX ADMIN — DULOXETINE HYDROCHLORIDE 30 MG: 30 CAPSULE, DELAYED RELEASE ORAL at 06:08

## 2021-10-21 RX ADMIN — ATORVASTATIN CALCIUM 40 MG: 40 TABLET, FILM COATED ORAL at 06:08

## 2021-10-21 RX ADMIN — DEXAMETHASONE SODIUM PHOSPHATE 6 MG: 4 INJECTION, SOLUTION INTRA-ARTICULAR; INTRALESIONAL; INTRAMUSCULAR; INTRAVENOUS; SOFT TISSUE at 06:08

## 2021-10-21 RX ADMIN — KETOROLAC TROMETHAMINE 15 MG: 30 INJECTION, SOLUTION INTRAMUSCULAR at 11:40

## 2021-10-21 RX ADMIN — KETOROLAC TROMETHAMINE 15 MG: 30 INJECTION, SOLUTION INTRAMUSCULAR at 06:08

## 2021-10-21 RX ADMIN — ACETAMINOPHEN 1000 MG: 500 TABLET ORAL at 06:08

## 2021-10-21 RX ADMIN — DOCUSATE SODIUM 100 MG: 100 CAPSULE, LIQUID FILLED ORAL at 06:08

## 2021-10-21 ASSESSMENT — COGNITIVE AND FUNCTIONAL STATUS - GENERAL
DRESSING REGULAR UPPER BODY CLOTHING: A LITTLE
DRESSING REGULAR LOWER BODY CLOTHING: A LITTLE
CLIMB 3 TO 5 STEPS WITH RAILING: A LITTLE
SUGGESTED CMS G CODE MODIFIER DAILY ACTIVITY: CK
TOILETING: A LITTLE
MOBILITY SCORE: 23
PERSONAL GROOMING: A LITTLE
DAILY ACTIVITIY SCORE: 18
SUGGESTED CMS G CODE MODIFIER MOBILITY: CI
HELP NEEDED FOR BATHING: A LOT

## 2021-10-21 ASSESSMENT — PAIN DESCRIPTION - PAIN TYPE: TYPE: SURGICAL PAIN

## 2021-10-21 ASSESSMENT — GAIT ASSESSMENTS
DEVIATION: STEP TO
ASSISTIVE DEVICE: FRONT WHEEL WALKER
DISTANCE (FEET): 15
GAIT LEVEL OF ASSIST: SUPERVISED
DISTANCE (FEET): 100

## 2021-10-21 ASSESSMENT — ACTIVITIES OF DAILY LIVING (ADL): TOILETING: INDEPENDENT

## 2021-10-21 NOTE — PROGRESS NOTES
"S: Patient doing well, mobilizing without difficulty.Had some twitching in thighs, that has now passed.  Worse in left.  Has lumbar stenosis.    O: Patient has good pain control, good mobility, and operated leg is neurovascularly intact.    Dressing is clean, dry, and intact    Blood pressure 100/58, pulse 63, temperature 36.5 °C (97.7 °F), temperature source Oral, resp. rate 18, height 1.626 m (5' 4\"), weight 81.1 kg (178 lb 12.7 oz), last menstrual period 01/01/2016, SpO2 100 %.            Intake/Output Summary (Last 24 hours) at 10/21/2021 0720  Last data filed at 10/20/2021 2000  Gross per 24 hour   Intake 2050 ml   Output 400 ml   Net 1650 ml             A:  Patient is stable and comfortable and ready for discharge  Patient Active Problem List    Diagnosis Date Noted   • Impaired fasting blood sugar 08/26/2021   • Elevated liver enzymes 08/26/2021   • Right hip pain 07/29/2021   • Spondylolisthesis 07/13/2021   • Spinal stenosis of lumbar region 07/13/2021   • Second hand smoke exposure 05/13/2021   • BMI 30.0-30.9,adult 05/13/2021   • Back pain with radiculopathy 05/13/2021   • Paresthesia 05/13/2021   • Incontinence of urine in female 05/13/2021   • Pain due to varicose veins of both lower extremities 01/10/2020   • Peripheral venous insufficiency 01/10/2020   • Osteopenia of lumbar spine 05/27/2015   • Right carpal tunnel syndrome 05/27/2015   • Closed fracture of distal end of right radius 03/25/2015   • Hyperlipidemia 01/27/2015   • Osteoarthritis of right hip 01/27/2015   • Vitiligo 01/27/2015         PLAN:Patient ready for discharge to home if they are safe and comfortable  "

## 2021-10-21 NOTE — PROGRESS NOTES
COVID 19 surge in effect.     Received report from night shift RN. Assumed care of patient at 0715. Pt is awake and alert. Pt states pain 1/10. Pt denies numbness, or tingling to extremities at this time. Denies nausea. Assessment completed, CMS intact. Polar ice to surgical site. Dressing to R hip CDI. Pt updated on plan of care for the day. Answered all questions. Call light within reach, bed locked and in lowest position. No other needs at this time.

## 2021-10-21 NOTE — THERAPY
Physical Therapy   Initial Evaluation     Patient Name: Garima Banda  Age:  71 y.o., Sex:  female  Medical Record #: [id number]  Today's Date: 10/21/2021     Precautions  Precautions: (P) Weight Bearing As Tolerated Right Lower Extremity;Anterior Hip Precautions  Comments: s/p R JACOBO    Assessment  Patient is 71 y.o. female with a diagnosis of R THR ant.Pt lives at home with Brother and is active Pt is safe with bed mob,transfers ,ambulation and stairs.She understands HEP and precautions and has no equipment needs       Plan    Recommend Physical Therapy for Evaluation only      10/21/21 1134   Total Time Spent   Total Time Spent (Mins) 30   Charge Group   PT Evaluation PT Evaluation Mod   Initial Contact Note    Initial Contact Note Order Received and Verified, Physical Therapy Evaluation in Progress with Full Report to Follow.   Precautions   Precautions Weight Bearing As Tolerated Right Lower Extremity;Anterior Hip Precautions   Pain 0 - 10 Group   Therapist Pain Assessment 2   Prior Living Situation   Prior Services None   Housing / Facility 1 Story House   Steps Into Home 2   Steps In Home 0   Equipment Owned Front-Wheel Walker;Single Point Cane   Lives with - Patient's Self Care Capacity   (brother and sis in law)   Prior Level of Functional Mobility   Bed Mobility Independent   Transfer Status Independent   Ambulation Independent   Distance Ambulation (Feet)   (community amb)   Assistive Devices Used None   Stairs Independent   History of Falls   History of Falls No   Passive ROM Lower Body   Passive ROM Lower Body X   Active ROM Lower Body    Active ROM Lower Body  X   Strength Lower Body   Lower Body Strength  X   Coordination Lower Body    Coordination Lower Body  WDL   Balance Assessment   Sitting Balance (Static) Good   Sitting Balance (Dynamic) Good   Standing Balance (Static) Good   Standing Balance (Dynamic) Good   Weight Shift Sitting Good   Weight Shift Standing Good   Gait Analysis   Gait Level  Of Assist Supervised   Assistive Device Front Wheel Walker   Distance (Feet) 100   # of Times Distance was Traveled 2   Deviation Step To   # of Stairs Climbed 3   Level of Assist with Stairs Minimal Assist   Weight Bearing Status wbat R   Functional Mobility   Sit to Stand Supervised   Bed, Chair, Wheelchair Transfer Supervised   Toilet Transfers Supervised   Transfer Method Stand Step   How much difficulty does the patient currently have...   Turning over in bed (including adjusting bedclothes, sheets and blankets)? 4   Sitting down on and standing up from a chair with arms (e.g., wheelchair, bedside commode, etc.) 4   Moving from lying on back to sitting on the side of the bed? 4   How much help from another person does the patient currently need...   Moving to and from a bed to a chair (including a wheelchair)? 4   Need to walk in a hospital room? 4   Climbing 3-5 steps with a railing? 3   6 clicks Mobility Score 23   Activity Tolerance   Sitting Edge of Bed 10   Standing 10   Patient / Family Goals    Patient / Family Goal #1 Home   Anticipated Discharge Equipment and Recommendations   DC Equipment Recommendations None   Discharge Recommendations Recommend outpatient physical therapy services to address higher level deficits   Interdisciplinary Plan of Care Collaboration   IDT Collaboration with  Nursing   Session Information   Date / Session Number  10/21   Priority 0       DC Equipment Recommendations: (P) None  Discharge Recommendations: (P) Recommend outpatient physical therapy services to address higher level deficits

## 2021-10-21 NOTE — DISCHARGE INSTRUCTIONS
Discharge Education for patients on CLAIRE (Obstructive Sleep Apnea) Protocol    Prior to receiving sedation or anesthesia, we screen all patients for Obstructive Sleep Apnea.  During your screening, you were identified as having suspected, but not confirmed Obstructive Sleep Apnea(CLAIRE).    What is Obstructive Sleep Apnea?  Sleep apnea (AP-ne-ah) is a common disorder which involves breathing pauses that occur during sleep.  These can last from 10 seconds to a minute or longer.  Normal breathing resumes often with a loud snort or choking sound.    Sleep apnea occurs in all age groups and both genders but is more common in men and people over 40 years of age.  It has been estimated that as many as 18 million Americans have sleep apnea.  Most people who have sleep apnea don’t know they have it because it only occurs during sleep.  A family member and/or bed partner may first notice the signs of sleep apnea.  Sleep apnea is a chronic (ongoing) condition that disrupts the quality and quantity of your sleep repeatedly throughout the night.  This often results in excessive daytime sleepiness or fatigue during the day.  It may also contribute to high blood pressure, heart problems, and complications following medications used for surgery and procedures.    To establish a definitive diagnosis, further testing from a specialist would be needed.  We recommend that you follow up with your primary care physician.    We recommend that you should be with an adult observer for at least 24 hours after your sedation/anesthesia.  If you have a CPAP machine, you should wear it during any sleep period (day or night) for the week following your procedure.  We encourage you to sleep on your side or in a sitting position, even with napping.  Lying flat on your back increases the risk of apnea and airway obstruction during your post procedure recovery period.    It is important to prevent over-sedation that could increase your risk for apnea.   Please take all pain medication as directed by your physician.  If you are not getting pain relief, please contact your physician to discuss possible approaches to relieving pain while minimizing medications that can affect your breathing and oxygen levels.      Discharge Instructions    Discharged to home by car with relative. Discharged via wheelchair, hospital escort: Yes.  Special equipment needed: Not Applicable     Be sure to schedule a follow-up appointment with your primary care doctor or any specialists as instructed.     Discharge Plan:   Diet Plan: Discussed  Activity Level: Discussed  Confirmed Follow up Appointment: Patient to Call and Schedule Appointment  Confirmed Symptoms Management: Discussed  Medication Reconciliation Updated: Yes    I understand that a diet low in cholesterol, fat, and sodium is recommended for good health. Unless I have been given specific instructions below for another diet, I accept this instruction as my diet prescription.   Other diet: Resume home diet     Special Instructions: Discharge instructions for the Orthopedic Patient    Follow up with Primary Care Physician within 2 weeks of discharge to home, regarding:  Review of medications and diagnostic testing.  Surveillance for medical complications.  Workup and treatment of osteoporosis, if appropriate.     -Is this a Hip/Knee/Shoulder Joint Replacement patient? Yes   TOTAL HIP REPLACEMENT, AFTER-CARE GUIDELINES     These instructions provide you with information on caring for yourself and your hip after surgery. Your health care provider may also give you instructions that are more specific. Your treatment was planned and performed according to current medical practices but problems sometimes occur. Call your health care provider if you have any problems or questions.     WHAT TO EXPECT AFTER THE PROCEDURE   After your procedure, your hip will typically be stiff, sore, and bruised. This will improve over time.     Pain    · Follow your home pain management plan as discussed with your nurse and as directed by your provider.   · It is important to follow any scheduled pain medications for maximal pain relief.   · If prescribed opioid medication, the goal is to use opioids only as needed and to wean off prescription pain medicine as soon as possible.   · Ice use for pain control.  · Put ice in a plastic bag.   · Place a towel between your skin and the bag.   · Leave the ice on for 20 minutes, 2-3 times a day at a minimum.   · Most patients are off the pain pills by 3 weeks. If your pain continues to be severe, follow up with your provider.     Infection   Deep hip joint infections that require removal of the prostheses occur in less than 1.0% of patients. Lesser infections in the skin (cellulites) are more common and much more easily treated.   · Keep the incision as clean and dry as possible.   · Always wash your hands before touching your incision.   · Avoid dental care for 3 months after surgery. Your provider may recommend taking a dose of antibiotics an hour prior to any dental procedure. After 2 years, most providers recommend antibiotics only before an extensive procedure. Ask your provider what they recommend.   · Signs and symptoms of infection include low-grade fever, redness, pain, swelling and drainage from your incision. Notify your provider IMMEDIATELY if you develop ANY of these symptoms.     Post op Disturbances   · Bowel Habits - constipation is extremely common and caused by a combination of anesthesia, lack of mobility, dehydration and pain medicine. Use stool softeners or laxatives if necessary. It is important not to ignore this problem as bowel obstructions can be a serious complication after joint replacement surgery.   · Mood/Energy Level - Many patients experience a lack of energy and endurance for up to 2-3 months after surgery. Some people feel down and can even become depressed. This is likely due to  postoperative anemia, change in activity level, lack of sleep, pain medicine and just the emotional reaction to the surgery itself that is a big disruption in a person’s life. This usually passes. If symptoms persist, follow up with your primary care provider.   · Returning to Work - Your provider will give you specific instructions based on your profession. Generally, if you work a sedentary job requiring little standing or walking, most patients may return within 2-6 weeks. Manual labor jobs involving walking, lifting and standing may take 3-4 months. Your provider’s office can provide a release to part-time or light duty work early on in your recovery and progress you to full duty as able.   · Driving - You can begin driving once cleared by your provider, provided you are no longer taking narcotic pain medication or any other medications that impair driving. Discuss the length of time with your doctor as returning to driving will depend on things such as your vehicle, which hip was replaced (right or left) and if you do or do not have post-operative movement precautions.   · Avoiding falls - A fall during the first few weeks after surgery can damage your new hip and may result in a need for further surgery.  throw rugs and tack down loose carpeting. Be aware of floor hazards such as pets, small objects or uneven surfaces. Notify your provider of any falls.   · Airport Metal Detectors - The sensitivity of metal detectors varies and it is likely that your prosthesis will cause an alarm. Inform the  of your artificial joint.     Diet   · Resume your normal diet as tolerated.   · It is important to achieve a healthy nutritional status by eating a well-balanced diet on a regular basis.   · Your provider may recommend that you take iron and vitamin supplements.   · Continue to drink plenty of fluids.     Shower/Bathing   · You may shower as soon as you get home from the hospital unless otherwise  instructed.   · Keep your incision out of water to prevent infection. To keep the incision dry when showering, cover it with a plastic bag or plastic wrap. If your bandage is waterproof, this may not be necessary.   · Pat incision dry if it gets wet. Do not rub. Notify your provider.   · Do not submerge in a bath until cleared by your provider. Your staples must be out and the incision completely healed.     Dressing Changes: Only change your dressing if directed by your provider.   · Wash hands.   · Open all dressing change materials.   · Remove old dressing and discard.   · Inspect incision for signs of irritation or infection including redness, increase in clear drainage, yellow/green drainage, odor and surrounding skin hot to touch. Notify your provider if present.   ·  the new dressing by one corner and lay over the incision. Be careful not to touch the inside of the dressing that will lay over the incision.   · Secure in place as instructed.     Swelling/Bruising   · Swelling is normal after hip replacement and can involve the thigh, knee, calf and foot.   · Swelling can last from 3-6 months.   · To reduce swelling, elevate your leg higher than your heart while reclining. The first week you are home you should elevate your leg an equal amount of time as you are active.   · The swelling is usually worse after you go home since you are upright for longer periods of time.   · Bruising often does not appear until after you arrive home and can be quite dramatic- appearing purple, black, or green. Bruising is typically not concerning and will subside without any treatment.     Blood Clot Prevention   Your treatment plan includes multiple preventative measures to decrease the risk of blood clots in the legs (DVTs) and the less common, but serious, clots that travel to the lungs (pulmonary emboli). Most patients are at standard risk for them, but people who are at higher risk include those who have had previous  clots, a family history of clotting, smoking, diabetes, obesity, advanced age, use estrogen and/or live a sedentary lifestyle.     · Signs of blood clots in legs include - Swelling in thigh, calf or ankle that does not go down with elevation. Pain, heat and tenderness in calf, back of calf or groin area. NOTE: blood clots can occur in either leg.   · Signs of blood clots in lungs include - Sudden increased shortness of breath, sudden onset of chest pain, and localized chest pain with coughing.   · If you experience any of the above symptoms, notify your provider and seek medical attention immediately.   · You received anticoagulant therapy (blood thinners) in the hospital. Continue the prescribed blood-thinning medication at home, as directed by your provider.   · Your risk for developing a clot continues for up to 2-3 months after surgery. Avoid prolonged sitting and dehydration (long air trips and car trips). If you do take a trip during this time, please get up, move around every 1-1.5 hours, and discuss all travel plans with your provider.     Activity   Once you get home, stay active. The key is not to overdo it. While you can expect some good days and some bad days, you should notice a gradual improvement over time and a gradual increase in endurance over the next 6 to 12 months.     · Weight Bearing - You can begin to bear weight as tolerated unless otherwise directed by your provider. Use a walker, crutches or cane to assist with walking until you can walk smoothly (minimal or no limp) without assistance.   · Physical Precautions - To assure proper recovery and tissue healing, you may be asked to take special precautions when moving (sitting, bending or sleeping) - usually for the first 6 weeks after surgery. Precautions vary from patient to patient depending on surgical approach used by your provider. Follow any specific precautions provided by your provider and physical therapist until cleared by your  provider.   · Sitting - Early on it is easier to get up from a tall chair or a chair with arms. The physical therapist will show you how to sit and stand from a chair keeping your affected leg out in front of you. Get up and move around on a regular basis--at least once every hour.   · Walking - Walk as much as you like once your doctor gives permission to proceed, but remember that walking is no substitute for your prescribed exercises.  · Follow the home exercise program prescribed during your hospital stay as directed by your physical therapist or provider.   · Continued physical therapy may be prescribed after hospital discharge to strengthen your muscles and improve your gait/walking pattern. The decision to have therapy or not after the hospital stay is made by you and your provider prior to surgery or at your follow up appointment.   · Swimming is an excellent low impact activity; you can begin as soon as the wound is healed and your provider clears you. Using a pair of training fins may make swimming a more enjoyable and effective exercise.   · Sexual activity - Your provider can tell you when it is safe to resume sexual activity.   · Other activities - Low impact activities are preferred. If you have specific questions, consult your provider.     When to Call the Doctor   Call the provider if you experience:   · Fever over 100.5° F   · Increased pain, drainage, redness, odor or heat around the incision area   · Shaking chills   · Increased knee pain with activity and rest   · Increased pain in calf, tenderness or redness above or below the knee   · Increased swelling of calf, ankle, foot   · Sudden increased shortness of breath, sudden onset of chest pain, localized chest pain with coughing   · Incision opening   Or, if there are any questions or concerns about medications or care.     Infection statistic resource:    [email or web address]-[email or web address]-microbiology-[email or web address]-manifestations-[email or web address]-diagnosis     -Is this patient being discharged with medication to prevent blood clots?  Yes, Aspirin Aspirin, ASA oral tablets  What is this medicine?  ASPIRIN (AS pir in) is a pain reliever. It is used to treat mild pain and fever. This medicine is also used as directed by a doctor to prevent and to treat heart attacks, to prevent strokes and blood clots, and to treat arthritis or inflammation.  This medicine may be used for other purposes; ask your health care provider or pharmacist if you have questions.  COMMON BRAND NAME(S): Aspir-Low, Aspir-Serena, Aspirtab, Michael Advanced Aspirin, Michael Aspirin, Michael Aspirin Extra Strength, Michael Aspirin Plus, Michael Extra Strength, Michael Extra Strength Plus, Michael Genuine Aspirin, Michael Womens Aspirin, Bufferin, Bufferin Extra Strength, Bufferin Low Dose  What should I tell my health care provider before I take this medicine?  They need to know if you have any of these conditions:  · anemia  · asthma  · bleeding problems  · child with chickenpox, the flu, or other viral infection  · diabetes  · gout  · if you frequently drink alcohol containing drinks  · kidney disease  · liver disease  · low level of vitamin K  · lupus  · smoke tobacco  · stomach ulcers or other problems  · an unusual or allergic reaction to aspirin, tartrazine dye, other medicines, dyes, or preservatives  · pregnant or trying to get pregnant  · breast-feeding  How should I use this medicine?  Take this medicine by mouth with a glass of water. Follow the directions on the package or prescription label. You can take this medicine with or without food. If it upsets your stomach, take it with food. Do not take your medicine more often than directed.  Talk to your pediatrician regarding the use of this medicine in children. While this drug may be prescribed for children as young as 12 years of age  for selected conditions, precautions do apply. Children and teenagers should not use this medicine to treat chicken pox or flu symptoms unless directed by a doctor.  Patients over 65 years old may have a stronger reaction and need a smaller dose.  Overdosage: If you think you have taken too much of this medicine contact a poison control center or emergency room at once.  NOTE: This medicine is only for you. Do not share this medicine with others.  What if I miss a dose?  If you are taking this medicine on a regular schedule and miss a dose, take it as soon as you can. If it is almost time for your next dose, take only that dose. Do not take double or extra doses.  What may interact with this medicine?  Do not take this medicine with any of the following medications:  · cidofovir  · ketorolac  · probenecid  This medicine may also interact with the following medications:  · alcohol  · alendronate  · bismuth subsalicylate  · flavocoxid  · herbal supplements like feverfew, garlic, kimmy, ginkgo biloba, horse chestnut  · medicines for diabetes or glaucoma like acetazolamide, methazolamide  · medicines for gout  · medicines that treat or prevent blood clots like enoxaparin, heparin, ticlopidine, warfarin  · other aspirin and aspirin-like medicines  · NSAIDs, medicines for pain and inflammation, like ibuprofen or naproxen  · pemetrexed  · sulfinpyrazone  · varicella live vaccine  This list may not describe all possible interactions. Give your health care provider a list of all the medicines, herbs, non-prescription drugs, or dietary supplements you use. Also tell them if you smoke, drink alcohol, or use illegal drugs. Some items may interact with your medicine.  What should I watch for while using this medicine?  If you are treating yourself for pain, tell your doctor or health care professional if the pain lasts more than 10 days, if it gets worse, or if there is a new or different kind of pain. Tell your doctor if you  see redness or swelling. Also, check with your doctor if you have a fever that lasts for more than 3 days. Only take this medicine to prevent heart attacks or blood clotting if prescribed by your doctor or health care professional.  Do not take aspirin or aspirin-like medicines with this medicine. Too much aspirin can be dangerous. Always read the labels carefully.  This medicine can irritate your stomach or cause bleeding problems. Do not smoke cigarettes or drink alcohol while taking this medicine. Do not lie down for 30 minutes after taking this medicine to prevent irritation to your throat.  If you are scheduled for any medical or dental procedure, tell your healthcare provider that you are taking this medicine. You may need to stop taking this medicine before the procedure.  This medicine may be used to treat migraines. If you take migraine medicines for 10 or more days a month, your migraines may get worse. Keep a diary of headache days and medicine use. Contact your healthcare professional if your migraine attacks occur more frequently.  What side effects may I notice from receiving this medicine?  Side effects that you should report to your doctor or health care professional as soon as possible:  · allergic reactions like skin rash, itching or hives, swelling of the face, lips, or tongue  · breathing problems  · changes in hearing, ringing in the ears  · confusion  · general ill feeling or flu-like symptoms  · pain on swallowing  · redness, blistering, peeling or loosening of the skin, including inside the mouth or nose  · signs and symptoms of bleeding such as bloody or black, tarry stools; red or dark-brown urine; spitting up blood or brown material that looks like coffee grounds; red spots on the skin; unusual bruising or bleeding from the eye, gums, or nose  · trouble passing urine or change in the amount of urine  · unusually weak or tired  · yellowing of the eyes or skin  Side effects that usually do  not require medical attention (report to your doctor or health care professional if they continue or are bothersome):  · diarrhea or constipation  · headache  · nausea, vomiting  · stomach gas, heartburn  This list may not describe all possible side effects. Call your doctor for medical advice about side effects. You may report side effects to FDA at 5-764-VZX-2972.  Where should I keep my medicine?  Keep out of the reach of children.  Store at room temperature between 15 and 30 degrees C (59 and 86 degrees F). Protect from heat and moisture. Do not use this medicine if it has a strong vinegar smell. Throw away any unused medicine after the expiration date.  NOTE: This sheet is a summary. It may not cover all possible information. If you have questions about this medicine, talk to your doctor, pharmacist, or health care provider.  © 2020 [employer] (2018-01-30 10:42:13)      · Is patient discharged on Warfarin / Coumadin?   No     Depression / Suicide Risk    As you are discharged from this [facility] facility, it is important to learn how to keep safe from harming yourself.    Recognize the warning signs:  · Abrupt changes in personality, positive or negative- including increase in energy   · Giving away possessions  · Change in eating patterns- significant weight changes-  positive or negative  · Change in sleeping patterns- unable to sleep or sleeping all the time   · Unwillingness or inability to communicate  · Depression  · Unusual sadness, discouragement and loneliness  · Talk of wanting to die  · Neglect of personal appearance   · Rebelliousness- reckless behavior  · Withdrawal from people/activities they love  · Confusion- inability to concentrate     If you or a loved one observes any of these behaviors or has concerns about self-harm, here's what you can do:  · Talk about it- your feelings and reasons for harming yourself  · Remove any means that you might use to hurt yourself (examples:  pills, rope, extension cords, firearm)  · Get professional help from the community (Mental Health, Substance Abuse, psychological counseling)  · Do not be alone:Call your Safe Contact- someone whom you trust who will be there for you.  · Call your local CRISIS HOTLINE 913-4673 or 357-767-6903  · Call your local Children's Mobile Crisis Response Team [place] (628) 306-2723 or www.Voice123  · Call the toll free National Suicide Prevention Hotlines   · National Suicide Prevention Lifeline 326-303-QQUX (5877)  · National Hope Line Network 800-SUICIDE (822-7705)

## 2021-10-21 NOTE — PROGRESS NOTES
Pt discharged home in good and stable condition. Reviewed all discharge instructions and answered any questions. IV discontinued. Escorted downstairs via  at 1229

## 2021-10-21 NOTE — DISCHARGE SUMMARY
Garima Banda was admitted on 10/20/2021 for Arthritis of right hip [M16.11]  Right hip pain [M25.551]  Primary osteoarthritis of right hip [M16.11]  Patient was diagnosed with severe degenerative arthritis in the degenerative arthritis right hipand underwent a right total hiparthroplasty by Dr. Chavo Otto on the date of admission.    Hospital course:    Patient has done well without complications, and spent 1night(s) in the hospital.  Patient denies chest pain, calf pain or shortness of breath.   Pain is well-controlled at present.  Patient is ambulating well with the use of an assistive device, and progressing in physical therapy.   Patient is neurologically and vascularly intact.      Discharge date: October212021    Patient is being discharged to to home with close outpatient follow-up.     Allergies:  Gabapentin       Medication List      START taking these medications      Instructions   oxyCODONE immediate-release 5 MG Tabs  Commonly known as: ROXICODONE   Take 1-2 Tablets by mouth every four hours as needed for Severe Pain for up to 7 days.  Dose: 5-10 mg     traMADol 50 MG Tabs  Commonly known as: ULTRAM   Take 1 Tablet by mouth every 6 hours as needed (Moderate Pain (NRS Pain Scale 4-6; CPOT Pain Scale 3-5) if opiates not ordered or tolerated) for up to 10 days.  Dose: 50 mg        CONTINUE taking these medications      Instructions   acetaminophen 500 MG Tabs  Commonly known as: TYLENOL   Take 2 tablet by mouth every 8 hours as needed for pain. Do not exceed 6 tablets in 24 hours     ASPIRIN 81 PO   Take 1 Tablet by mouth 3 times a week.  Dose: 1 Tablet     atorvastatin 40 MG Tabs  Commonly known as: LIPITOR   Take 1 Tablet by mouth every day.  Dose: 40 mg     CoQ-10 100 MG Caps   Take 300 mg by mouth every day.  Dose: 300 mg     diphenhydrAMINE 25 MG Tabs  Commonly known as: BENADRYL   Take 25 mg by mouth as needed for Sleep.  Dose: 25 mg     DULoxetine 30 MG Cpep  Commonly known as: CYMBALTA    Take 1 Capsule by mouth 2 times a day.  Dose: 30 mg     estradiol 0.1 MG/GM vaginal cream  Commonly known as: ESTRACE   Insert 1 gram vaginally 2 to 3 times a week at bedtime     meloxicam 7.5 MG Tabs  Commonly known as: MOBIC   Take 1 Tablet by mouth every day.  Dose: 7.5 mg     PRESERVISION AREDS 2 PO   Take  by mouth every day.     QC CALCIUM 500MG-D3 PO   Take  by mouth every day.            Discharge Instructions:     Patient is instructed to ambulate and weight bear as tolerated with the use of an assistive device, and to continue physical therapy exercises given during this hospital stay.   Patient is to ice and elevate the surgical leg regularly.   Patient was given detailed wound care instructions, and will leave the dressing on until first post-op visit.   DVT prophylaxis: Aspirin 81mg daily for 6 weeks  Patient is to follow up with Dr. Otto's office in 1-2 weeks.

## 2021-10-21 NOTE — PROGRESS NOTES
Assumed care of pt at 1915. Received report from Valarie GREEN. Pt stated pain was 2 on the 0 to 10 scale and is comfortable at this time. Right hip dressing CDI. No complaints of numbness or tingling at this time. Pt right pedal pulse 2+. Pt able to dorsi and plantar flex. Pt educated to use IS and was able to reach 1750. Polar ice in place. No other needs at this time. Call light in reach, bed in lowest position. COVID 19 surge in effect.

## 2021-10-21 NOTE — CARE PLAN
The patient is Stable - Low risk of patient condition declining or worsening    Shift Goals  Clinical Goals: ambulate one more time, pain management    Progress made toward(s) clinical / shift goals:  Pt ambulating to the restroom throughout the night. Pain managed with medication per MAR and polar ice.     Patient is not progressing towards the following goals: n/a

## 2021-10-21 NOTE — THERAPY
"Occupational Therapy   Initial Evaluation     Patient Name: Garima Banda  Age:  71 y.o., Sex:  female  Medical Record #: [id number]  Today's Date: 10/21/2021     Precautions  Precautions: No Weight Bearing Restrictions Right Lower Extremity  Comments: s/p R JACOBO    Assessment    Patient is 71 y.o. female with a diagnosis of R hip DJD, now POD #1 R JACOBO, anterolateral approach. Additional factors influencing patient status / progress: lumbar stenosis, varicose veins, peripheral venous insufficiency. Pt lives with brother and sister in law, with the latter being her \"surgery angle\". She was I with all ADLs, requires assistance with some IADLs, ambulates with SPC use both inside and outside the home, and drives at PLOF. She was educated in detail re: anterior hip precautions, post op care, dressing techniques, use of AEs for LB dressing (pt has them at home and required training), shower strategies with CASH dressing mgt, use of appropriate DMEs/AD, and home safety. Pt presents today with very minimal post op pain, though was able to tolerate most ADLs and functional txfs with FWW and AE use at spv level. Pt's home is adequately equipped with DMEs to aid her during recovery and SIRI is able to provide good support upon dc home. Return demo was necessary during dressing, txfs, and use of AEs to ensure mastery of techniques. No acute OT needs at this time.    Plan    Recommend Occupational Therapy for Evaluation only .    DC Equipment Recommendations: None  Discharge Recommendations: Anticipate that the patient will have no further occupational therapy needs after discharge from the hospital     Subjective    \"I heard a little bit of it from earlier.\"     Objective       10/21/21 1105   Prior Living Situation   Prior Services None   Housing / Facility 1 Story House   Steps Into Home 2   Steps In Home 0   Bathroom Set up Walk In Shower  (shower stool)   Equipment Owned Front-Wheel Walker;Single Point Cane;Tub / Shower " Seat;Raised Toilet Seat Without Arms;Sock Aid;Reacher  (long handled shoehorn and sponge)   Lives with - Patient's Self Care Capacity Other (Comments)  (brother and sister in law)   Comments Pt lives with brother and sister in law, with the latter being her surgery angle.   Prior Level of ADL Function   Self Feeding Independent   Grooming / Hygiene Independent   Bathing Independent   Dressing Independent   Toileting Independent   Prior Level of IADL Function   Medication Management Independent   Laundry Independent   Kitchen Mobility Independent   Finances Independent   Home Management Requires Assist   Shopping Requires Assist   Prior Level Of Mobility Independent With Device in Home;Independent With Device in Community   Driving / Transportation Driving Independent   Occupation (Pre-Hospital Vocational) Retired Due To Age   Comments Pt was using SPC both inside and outside the home prior to sx   History of Falls   History of Falls No   Precautions   Precautions No Weight Bearing Restrictions Right Lower Extremity   Comments s/p R JACOBO   Pain   Pain Scales 0 to 10 Scale    Intervention Cold Pack;Repositioned;Rest   Pain 0 - 10 Group   Location Hip   Location Orientation Right   Pain Rating Scale (NPRS) 1   Description Dull;Aching   Comfort Goal Comfort with Movement   Therapist Pain Assessment Post Activity Pain Same as Prior to Activity   Non Verbal Descriptors   Non Verbal Scale  Calm;Unlabored Breathing   Cognition    Cognition / Consciousness WDL   Level of Consciousness Alert   Comments pleasant and cooperative   Active ROM Upper Body   Active ROM Upper Body  WDL   Dominant Hand Right   Strength Upper Body   Upper Body Strength  WDL   Balance Assessment   Sitting Balance (Static) Good   Sitting Balance (Dynamic) Fair +   Standing Balance (Static) Good   Standing Balance (Dynamic) Fair +   Weight Shift Sitting Good   Weight Shift Standing Good   Comments with FWW   Bed Mobility    Supine to Sit Supervised   Sit  to Supine   (up in chair post session)   Scooting Supervised   Comments HOB flat, rails down/not utilized   ADL Assessment   Grooming Standing;Supervision   Upper Body Dressing Supervision   Lower Body Dressing Supervision  (with AE use)   Toileting Supervision   How much help from another person does the patient currently need...   Putting on and taking off regular lower body clothing? 3   Bathing (including washing, rinsing, and drying)? 2   Toileting, which includes using a toilet, bedpan, or urinal? 3   Putting on and taking off regular upper body clothing? 3   Taking care of personal grooming such as brushing teeth? 3   Eating meals? 4   6 Clicks Daily Activity Score 18   Functional Mobility   Sit to Stand Supervised   Bed, Chair, Wheelchair Transfer Supervised   Toilet Transfers Supervised   Transfer Method Stand Step   Mobility in room with FWW   Distance (Feet) 15   # of Times Distance was Traveled 2   Activity Tolerance   Sitting in Chair 10 mins total   Sitting Edge of Bed 20 mins   Standing 10 mins total   Comments rest of time in supine for post op educ   Education Group   Education Provided Role of Occupational Therapist;Hip Precautions;Home Safety;Transfers;Activities of Daily Living;Adaptive Equipment   Role of Occupational Therapist Patient Response Patient;Acceptance;Explanation   Hip Precautions Patient Response Patient;Acceptance;Explanation;Demonstration;Handout;Verbal Demonstration;Action Demonstration   Home Safety Patient Response Patient;Acceptance;Explanation;Verbal Demonstration   Transfers Patient Response Patient;Acceptance;Explanation;Demonstration;Verbal Demonstration;Action Demonstration   ADL Patient Response Patient;Acceptance;Explanation;Demonstration;Verbal Demonstration;Action Demonstration   Adaptive Equipment Patient Response Patient;Acceptance;Explanation;Demonstration;Handout;Verbal Demonstration;Action Demonstration   Anticipated Discharge Equipment and Recommendations   DC  Equipment Recommendations None   Discharge Recommendations Anticipate that the patient will have no further occupational therapy needs after discharge from the hospital   Interdisciplinary Plan of Care Collaboration   IDT Collaboration with  Nursing;Physical Therapist   Patient Position at End of Therapy Seated;Call Light within Reach;Tray Table within Reach;Phone within Reach   Collaboration Comments RN aware of OT session   Session Information   Date / Session Number  10/21 #1 (one time only)   Priority 0

## 2021-10-21 NOTE — CARE PLAN
The patient is Stable - Low risk of patient condition declining or worsening    Shift Goals  Clinical Goals: Patient will walk to the bathroom to void    Progress made toward(s) clinical / shift goals:  Patient walked to the bathroom to void using FWW with RN and CNA. Patient pain remain 1/10 till the end of day shift.       Problem: Fall Risk  Goal: Patient will remain free from falls  Outcome: Progressing

## 2021-10-25 ENCOUNTER — TELEPHONE (OUTPATIENT)
Dept: HEALTH INFORMATION MANAGEMENT | Facility: OTHER | Age: 71
End: 2021-10-25

## 2021-10-25 NOTE — TELEPHONE ENCOUNTER
Outreach call to mbr to follow up with hospital discharge, follow up care and [facility] benefits. Mbr reported she is doing well. Indicated she has an appt with ortho on Nov 3, 2021 and her annual with her PCP on 11/18/21. Discussed setting up a sooner follow up visit with PCP and/or [facility]. Mbr declined [facility] TC services at this time but is agreeable to sooner PCP appt. Appt scheduled for 11/9/21 at 0900. Mbr does have Advanced Directive on file-Healthcare POA appointing a Rachele as her primary and only agent. Mbr confirmed documents still reflects her current wishes. No other barriers or needs identified. Provided mbr with contact number.

## 2021-11-04 ENCOUNTER — TELEPHONE (OUTPATIENT)
Dept: MEDICAL GROUP | Facility: MEDICAL CENTER | Age: 71
End: 2021-11-04

## 2021-11-04 NOTE — TELEPHONE ENCOUNTER
NEW PATIENT VISIT PRE-VISIT PLANNING    Patient NOT contacted for New Patient Pre-Visit Planning, NU2U patient.     1.  [employer] Patient is checked in Patient Demographics?Yes    2.  Immunizations were updated in Epic using Reconcile Outside Information activity? Yes. Immunizations queried through Web-IZ and reconciled from outside sources. Immunization records are up to date.         3.  Is this appointment scheduled as a Hospital Follow-Up? No    4.  Patient is due for the following Health Maintenance Topics:   Health Maintenance Due   Topic Date Due   • Annual Wellness Visit  Never done   • MAMMOGRAM  Never done   • IMM ZOSTER VACCINES (2 of 3) 03/24/2015     5.  Reviewed/Updated the following:  · Preferred Pharmacy? Yes  · Preferred Lab? Yes  · Preferred Communication? Yes  · Allergies? Yes. No new allergies to reconcile from outside sources.   · Medications? YES. Was Abstract Encounter opened and chart updated? NO. No new medications to reconcile form outside sources.  · Social History? Yes  · Family History (document living status of immediate family members and if + hx of cancer, diabetes, hypertension, hyperlipidemia, heart attack, stroke) No    6.  Updated Care Team?  · DME Company (gait device, O2, CPAP, etc.) N\A  · Other Specialists (eye doctor, derm, GYN, cardiology, endo, etc): N\A    7.  AHA (Puls8) form printed for Provider? No, patient does not have any open alerts         This Pre-Visit Planning note has been created for the Provider and Medical Assistant to review prior to the patient's office appointment.   Patient is NOT REQUIRED to follow-up on this note.

## 2021-11-10 ENCOUNTER — OFFICE VISIT (OUTPATIENT)
Dept: MEDICAL GROUP | Facility: MEDICAL CENTER | Age: 71
End: 2021-11-10
Payer: MEDICARE

## 2021-11-10 VITALS
OXYGEN SATURATION: 95 % | HEIGHT: 64 IN | SYSTOLIC BLOOD PRESSURE: 102 MMHG | BODY MASS INDEX: 30.41 KG/M2 | TEMPERATURE: 98.4 F | WEIGHT: 178.13 LBS | DIASTOLIC BLOOD PRESSURE: 70 MMHG | HEART RATE: 69 BPM

## 2021-11-10 DIAGNOSIS — R32 INCONTINENCE OF URINE IN FEMALE: ICD-10-CM

## 2021-11-10 DIAGNOSIS — R73.03 PREDIABETES: ICD-10-CM

## 2021-11-10 DIAGNOSIS — M16.11 PRIMARY OSTEOARTHRITIS OF RIGHT HIP: ICD-10-CM

## 2021-11-10 PROBLEM — R39.15 URINARY URGENCY: Status: ACTIVE | Noted: 2021-11-10

## 2021-11-10 PROBLEM — R74.8 ELEVATED LIVER ENZYMES: Status: RESOLVED | Noted: 2021-08-26 | Resolved: 2021-11-10

## 2021-11-10 PROBLEM — N39.41 URGE INCONTINENCE OF URINE: Status: ACTIVE | Noted: 2021-11-10

## 2021-11-10 PROCEDURE — 99214 OFFICE O/P EST MOD 30 MIN: CPT | Performed by: INTERNAL MEDICINE

## 2021-11-10 ASSESSMENT — FIBROSIS 4 INDEX: FIB4 SCORE: 1.24

## 2021-11-10 NOTE — ASSESSMENT & PLAN NOTE
Continue close follow-up with Ortho, next appointment December 6, 2021.  We will start physical therapy.

## 2021-11-10 NOTE — ASSESSMENT & PLAN NOTE
We will attempt to lose weight, work on healthy diet and exercise.  We discussed increase vegetable intake and smaller portion choices.

## 2021-11-10 NOTE — PROGRESS NOTES
Subjective:     Chief Complaint   Patient presents with   • Establish Care     had surgery on 10/20 totla hip Left       Diagnoses of Incontinence of urine in female, Primary osteoarthritis of right hip, BMI 30.0-30.9,adult, and Prediabetes were pertinent to this visit.    HISTORY OF THE PRESENT ILLNESS: Patient is a 71 y.o. female. This pleasant patient is here today to establish care and discuss her recent right hip arthroplasty. Her prior PCP was Dr. Saunders.    Problem   Incontinence of Urine in Female    This condition has been evaluated by her neurologist.  She has been prescribed Myrbetriq, however she was concerned about possible side effects.  We have reviewed side effects together and most common of them is elevated blood pressure.  Patient usually runs on the low side and her blood pressure is 100 systolic.  Patient will give it a try.     Prediabetes    Patient is overweight and her hemoglobin A1c is in prediabetic range.  We had extensive discussion about diet and exercise.  No medications indicated for now.     Bmi 30.0-30.9,Adult    This is a chronic problem.  Patient has not at a low exercise for the last couple of months due to severe right-sided hip pain.  Since she no had right hip arthroplasty, she will start physical therapy and potentially start exercise more in attempt to lose weight.  Spoke about diet and implementing smaller portions.     Osteoarthritis of Right Hip    Status post right hip arthroplasty 10/20/2021 with Dr. Chavo Otto, uncomplicated postsurgical course.  Patient is now off opioids.  Currently on meloxicam 7.5 mg 1-2 times per day and Tylenol up to 2 to 3 g/day.  Starting physical therapy this week.  Follow-up with Ortho on December 6, 2021.  Postsurgical wound is healing well, dry no discharge.     Elevated Liver Enzymes (Resolved)   Closed Fracture of Distal End of Right Radius (Resolved)     Past Medical History:   Diagnosis Date   • Arthritis     osteo arthritis   • Closed  "fracture of distal end of right radius 3/25/2015   • Depression    • Elevated liver enzymes     hx of   • Hay fever    • High cholesterol    • Hyperlipidemia    • Low blood pressure     \"no need for medications according to doctors\"   • Pain     back/hips   • Sleep apnea     suspected, no sleep study, CLAIRE score of 4   • Urinary incontinence      Past Surgical History:   Procedure Laterality Date   • PB TOTAL HIP ARTHROPLASTY Right 10/20/2021    Procedure: ARTHROPLASTY, HIP, TOTAL;  Surgeon: Chavo Otto M.D.;  Location: SURGERY [facility];  Service: Orthopedics   • OTHER      trigger finger R; R radial fracture repair no hardare (pins taken out), CTR b/l      Family History   Problem Relation Age of Onset   • Cancer Mother         breast. lung   • Cancer Father         colon   • Diabetes Brother      Social History     Tobacco Use   • Smoking status: Never Smoker   • Smokeless tobacco: Never Used   • Tobacco comment: second hand smoke casinos x 28 years   Vaping Use   • Vaping Use: Never used   Substance Use Topics   • Alcohol use: Yes     Comment: 1 drink per week   • Drug use: Yes     Types: Inhaled     Comment: jai (3x per year)     Current Outpatient Medications Ordered in Epic   Medication Sig Dispense Refill   • Mirabegron ER 50 MG TABLET SR 24 HR Take 50 mg by mouth every day.     • ASPIRIN 81 PO Take 1 Tablet by mouth 3 times a week.     • DULoxetine (CYMBALTA) 30 MG Cap DR Particles Take 1 Capsule by mouth 2 times a day. 200 Capsule 3   • Calcium-Cholecalciferol (QC CALCIUM 500MG-D3 PO) Take  by mouth every day.     • Coenzyme Q10 (COQ-10) 100 MG Cap Take 300 mg by mouth every day.     • Multiple Vitamins-Minerals (PRESERVISION AREDS 2 PO) Take  by mouth every day.     • diphenhydrAMINE (BENADRYL) 25 MG Tab Take 25 mg by mouth as needed for Sleep.     • atorvastatin (LIPITOR) 40 MG Tab Take 1 Tablet by mouth every day. 90 Tablet 3   • meloxicam (MOBIC) 7.5 MG Tab Take 1 Tablet by mouth every " "day. 30 Tablet 3   • estradiol (ESTRACE) 0.1 MG/GM vaginal cream Insert 1 gram vaginally 2 to 3 times a week at bedtime (Patient not taking: Reported on 8/26/2021)       No current [facility]-ordered facility-administered medications on file.       Health Maintenance: Patient is due for Shingrix vaccine COVID-19 booster.    ROS:   Gen: no fevers/chills  ENT: no sore throat  Pulm: no sob  CV: no chest pain  GI: no nausea/vomiting      Objective:     Exam: /70 (BP Location: Left arm, Patient Position: Sitting, BP Cuff Size: Adult)   Pulse 69   Temp 36.9 °C (98.4 °F) (Temporal)   Ht 1.626 m (5' 4\")   Wt 80.8 kg (178 lb 2.1 oz)   SpO2 95%  Body mass index is 30.58 kg/m².    General: Normal appearing. No distress.  HEENT: Normocephalic. Eyes conjunctiva clear lids without ptosis, pupils equal and reactive to light accommodation, oropharynx is without erythema, edema or exudates.   Pulmonary: Clear to auscultation with no rhonchi or wheezing.    Cardiovascular: Regular rate and rhythm without murmur. Carotid and radial pulses are intact and equal bilaterally.  Lymph: No cervical or supraclavicular lymph nodes are palpable  Skin: Warm and dry.   Musculoskeletal: Ambulation with a cane due to recent right hip arthroplasty, postoperative wound is healing well, wound is dry with no discharge.  Psych: Normal mood and affect. Alert and oriented x3. Judgment and insight is normal.    Labs: Have reviewed patient's lipid panel, CBC and CMP.    Assessment & Plan:   71 y.o. female with the following -    Problem List Items Addressed This Visit     BMI 30.0-30.9,adult     After postoperative hip pain resolves, patient is determined to go back contract and do water aerobics.  We will work on healthy diet.         Incontinence of urine in female     Patient will do a trial of Myrbetriq and continue working on pelvic floor exercise.         Relevant Medications    Mirabegron ER 50 MG TABLET SR 24 HR    Osteoarthritis of right hip "     Continue close follow-up with Ortho, next appointment December 6, 2021.  We will start physical therapy.         Prediabetes     We will attempt to lose weight, work on healthy diet and exercise.  We discussed increase vegetable intake and smaller portion choices.               Return in about 3 months (around 2/10/2022) for Medicare Wellness visit.    Please note that this dictation was created using voice recognition software. I have made every reasonable attempt to correct obvious errors, but I expect that there are errors of grammar and possibly content that I did not discover before finalizing the note.

## 2021-11-10 NOTE — ASSESSMENT & PLAN NOTE
After postoperative hip pain resolves, patient is determined to go back contract and do water aerobics.  We will work on healthy diet.

## 2021-11-29 ENCOUNTER — HOSPITAL ENCOUNTER (OUTPATIENT)
Dept: RADIOLOGY | Facility: MEDICAL CENTER | Age: 71
End: 2021-11-29
Attending: INTERNAL MEDICINE
Payer: MEDICARE

## 2021-11-29 DIAGNOSIS — Z12.31 ENCOUNTER FOR SCREENING MAMMOGRAM FOR MALIGNANT NEOPLASM OF BREAST: ICD-10-CM

## 2021-12-06 PROBLEM — M16.12 PRIMARY OSTEOARTHRITIS OF LEFT HIP: Status: ACTIVE | Noted: 2021-12-06

## 2021-12-13 ENCOUNTER — TELEPHONE (OUTPATIENT)
Dept: HEALTH INFORMATION MANAGEMENT | Facility: OTHER | Age: 71
End: 2021-12-13

## 2021-12-14 NOTE — TELEPHONE ENCOUNTER
Called patient and  scheduled COMPREHENSIVE HEALTH  ASSESSMENT.   Warm transfer to [facility] Sleep Study Scheduling, gave Pt my direct number and let her know I was her , not able to discuss how a  can help.

## 2021-12-28 PROBLEM — F33.40 RECURRENT MAJOR DEPRESSIVE DISORDER, IN REMISSION (HCC): Status: ACTIVE | Noted: 2021-12-28

## 2021-12-28 PROBLEM — M51.36 DEGENERATIVE DISC DISEASE, LUMBAR: Status: ACTIVE | Noted: 2021-12-28

## 2021-12-28 PROBLEM — E66.9 OBESITY (BMI 30.0-34.9): Status: ACTIVE | Noted: 2021-12-28

## 2022-01-17 RX ORDER — MELOXICAM 7.5 MG/1
7.5 TABLET ORAL DAILY
Qty: 30 TABLET | Refills: 0 | Status: SHIPPED | OUTPATIENT
Start: 2022-01-17 | End: 2022-02-25

## 2022-01-31 ENCOUNTER — HOSPITAL ENCOUNTER (OUTPATIENT)
Dept: RADIOLOGY | Facility: MEDICAL CENTER | Age: 72
End: 2022-01-31
Attending: INTERNAL MEDICINE
Payer: MEDICARE

## 2022-01-31 PROCEDURE — 77063 BREAST TOMOSYNTHESIS BI: CPT

## 2022-02-07 ENCOUNTER — TELEPHONE (OUTPATIENT)
Dept: MEDICAL GROUP | Facility: MEDICAL CENTER | Age: 72
End: 2022-02-07

## 2022-02-07 RX ORDER — TRIAMCINOLONE ACETONIDE 1 MG/G
CREAM TOPICAL
COMMUNITY
Start: 2022-01-06

## 2022-02-07 NOTE — TELEPHONE ENCOUNTER
ANNUAL WELLNESS VISIT PRE-VISIT PLANNING    Patient WAS NOT contacted to complete the Annual Wellness Pre-Visit Planning Note. Information was reviewed in the chart.     1.  Reviewed notes from the last office visit: Yes    2.  If any orders were ordered or intended to be done prior to visit (i.e. 6 mos follow-up), do we have results/consult notes or has patient scheduled?   · Labs - Labs were not ordered at last office visit. Recent labs are in chart.  Note: If patient appointment is for lab review and patient did not complete labs, check with provider if OK to reschedule patient until labs completed.  · Imaging - Imaging was not ordered at last office visit.  · Referrals - No referrals were ordered at last office visit.    3.  Immunizations were updated in Epic using Reconcile Outside Information activity? Yes. Immunization checked through WEB_IZ and reconciled from outside sources. Immunization records are up to date.   · Is patient due for Tdap? NO  · Is patient due for Shingrix? YES. Patient was not notified of copay/out of pocket cost.     PLEASE NOTE: The Shingles/Zoster Vaccines will no longer be given at any [facility] location as of December 2021. Patient is advised to make an appointment with a local pharmacy to complete this immunization.    4.  Patient is due for the following Health Maintenance Topics:   Health Maintenance Due   Topic Date Due   • Annual Wellness Visit  Never done   • IMM ZOSTER VACCINES (2 of 3) 03/24/2015       5.  Reviewed/Updated the following:  · Preferred Pharmacy? Yes  · Preferred Lab? Yes  · Preferred Communication? Yes  · Allergies? Yes; No allergies to reconcile from outside sources  · Medications? YES. Was Abstract Encounter opened and chart updated? NO; One medication reconciled from outside source to patient chart. Provider and Medical Assistant to review medications at patient's next office visit  · Social History? Yes  · Family History (document living status of immediate  family members and if + hx of cancer, diabetes, hypertension, hyperlipidemia, heart attack, stroke) No    6.  Care Team Updated:  · DME Company (gait device, O2, CPAP, etc.): N\A  · Other Specialists (eye doctor, derm, GYN, cardiology, endo, etc): N\A    7.  Patient was not advised: “This is a free wellness visit. The provider will screen for medical conditions to help you stay healthy. If you have other concerns to address you may be asked to discuss these at a separate visit or there may be an additional fee.”     8.  AHA (Puls8) form printed for Provider? Email sent to [facility] requesting form         The Pre-Visit Planning note was created for the Provider and Medical Assistant to review prior to the patient's office visit. Patient IS NOT REQUIRED to follow-up on this note.

## 2022-02-08 ENCOUNTER — OFFICE VISIT (OUTPATIENT)
Dept: SLEEP MEDICINE | Facility: MEDICAL CENTER | Age: 72
End: 2022-02-08
Payer: MEDICARE

## 2022-02-08 VITALS
HEIGHT: 64 IN | WEIGHT: 182 LBS | HEART RATE: 67 BPM | RESPIRATION RATE: 16 BRPM | SYSTOLIC BLOOD PRESSURE: 104 MMHG | OXYGEN SATURATION: 96 % | DIASTOLIC BLOOD PRESSURE: 70 MMHG | BODY MASS INDEX: 31.07 KG/M2

## 2022-02-08 DIAGNOSIS — G47.00 FREQUENT NOCTURNAL AWAKENING: ICD-10-CM

## 2022-02-08 DIAGNOSIS — G47.30 SLEEP DISORDER BREATHING: Primary | ICD-10-CM

## 2022-02-08 PROCEDURE — 99203 OFFICE O/P NEW LOW 30 MIN: CPT | Performed by: STUDENT IN AN ORGANIZED HEALTH CARE EDUCATION/TRAINING PROGRAM

## 2022-02-08 ASSESSMENT — FIBROSIS 4 INDEX: FIB4 SCORE: 1.24

## 2022-02-08 ASSESSMENT — ENCOUNTER SYMPTOMS: SLEEP DISTURBANCE: 1

## 2022-02-08 NOTE — PROGRESS NOTES
[facility] Sleep Center Consult Note     Date: 2/8/2022 / Time: 12:44 PM      Thank you for requesting a sleep medicine consultation on Garima Banda at the sleep center. Presents today with the chief complaints of snoring, trouble with awakenings and restless sleep. She is referred by Tia Saunders M.D.  [address]  [address]  [place] [address] for evaluation and treatment of sleep disorder breathing .     HISTORY OF PRESENT ILLNESS:     Garima Banda is a 71 y.o. female with hyperlipidemia, depression, osteoarthritis, and obesity.  Presents to Sleep Clinic for evaluation of suspected sleep apnea.    She has noticed over the last several years her sleep is worsened.  Seems to stabilize in the past 6 months.  Overall she feels she gets a poor night sleep.  She consistently wakes up through the night about every 2 hours.  Sometimes with awakening she has trouble getting back to sleep.  Initially with falling asleep most of the time she can get to sleep within 20 to 40 minutes.  There are some nights will take up to an hour to fall asleep.  With her awakenings she will play on her phone or watch TV.  She has been told in the past that she has pauses in her breathing.  In addition she has woken up gasping for air at times.    As per supplemental questionnaire to be scanned or imported into chart:    [place] Sleepiness Score: 7    Sleep Schedule  Bedtime: Weekday & Weekend 11pm to 1am   Wake time: Weekday & Weekend 5-8am   Sleep-onset latency: 20 min or longer   Awakenings from sleep: 3-5  Difficulty falling back asleep: yes  Bedroom partner: no  Naps: Yes    DAYTIME SYMPTOMS:   Excessive daytime sleepiness: Yes sometimes. If watching TV falls asleep in evening   Daytime fatigue: Yes  Difficulty concentrating: No   Memory problems: No   Irritability:No   Work/school performance issues: No   Sleepiness with driving: No   Caffeine/stimulant use: Yes tea 2 cups a day   Alcohol use:Yes, How  "Many? 0-2 drinks a week      SLEEP RELATED SYMPTOMS  Snoring: Yes  Witnessed apnea or gasping/choking: Yes  Dry mouth or mouth breathing: Yes  Sweating: No   Teeth grinding/biting: Yes  Morning headaches: Yes  Refreshed Upon Awakening: Yes     SLEEP RELATED BEHAVIORS:  Parasomnias (walking, talking, eating, violence): No   Leg kicking: No   Restless legs - \"urge to move\": No   Nightmares: No  Recurrent: No   Dream enactment: No      NARCOLEPSY:  Cataplexy: No   Sleep paralysis: No   Sleep attacks: No   Hypnagogic/hypnopompic hallucinations: No     MEDICAL HISTORY  Past Medical History:   Diagnosis Date   • Arthritis     osteo arthritis   • Back pain    • Back pain    • Chickenpox    • Closed fracture of distal end of right radius 3/25/2015   • Cough    • Depression    • Elevated liver enzymes     hx of   • Frequent headaches    • Frequent urination    • Hay fever    • High cholesterol    • Hyperlipidemia    • Insomnia    • Low blood pressure     \"no need for medications according to doctors\"   • Morning headache    • Nasal drainage    • Obesity    • Pain     back/hips   • Painful joint    • Rhinitis    • Ringing in ears    • Sleep apnea     suspected, no sleep study, CLAIRE score of 4   • Toothache    • Urinary incontinence    • Wears glasses       SURGICAL HISTORY  Past Surgical History:   Procedure Laterality Date   • PB TOTAL HIP ARTHROPLASTY Right 10/20/2021    Procedure: ARTHROPLASTY, HIP, TOTAL;  Surgeon: Chavo Otto M.D.;  Location: SURGERY [facility];  Service: Orthopedics   • CARPAL TUNNEL RELEASE     • HIP REPLACEMENT, TOTAL     • OTHER      trigger finger R; R radial fracture repair no hardare (pins taken out), CTR b/l         FAMILY HISTORY  Family History   Problem Relation Age of Onset   • Cancer Mother         breast. lung   • Breast Cancer Mother    • Lung Cancer Mother    • Cancer Father         colon   • Colon Cancer Father    • Lung Cancer Father    • Diabetes Brother    • Prostate cancer " Brother        SOCIAL HISTORY  Social History     Socioeconomic History   • Marital status: Single     Spouse name: Not on file   • Number of children: Not on file   • Years of education: Not on file   • Highest education level: Not on file   Occupational History   • Not on file   Tobacco Use   • Smoking status: Never Smoker   • Smokeless tobacco: Never Used   • Tobacco comment: second hand smoke casinos x 28 years   Vaping Use   • Vaping Use: Never used   Substance and Sexual Activity   • Alcohol use: Yes     Alcohol/week: 0.6 oz     Types: 1 Standard drinks or equivalent per week     Comment: zero to two drinks per week   • Drug use: Not Currently     Types: Marijuana, Inhaled     Comment: I smoke pot maybe three to four times a year   • Sexual activity: Not on file   Other Topics Concern   • Not on file   Social History Narrative   • Not on file     Social Determinants of Health     Financial Resource Strain:    • Difficulty of Paying Living Expenses: Not on file   Food Insecurity:    • Worried About Running Out of Food in the Last Year: Not on file   • Ran Out of Food in the Last Year: Not on file   Transportation Needs:    • Lack of Transportation (Medical): Not on file   • Lack of Transportation (Non-Medical): Not on file   Physical Activity:    • Days of Exercise per Week: Not on file   • Minutes of Exercise per Session: Not on file   Stress:    • Feeling of Stress : Not on file   Social Connections:    • Frequency of Communication with Friends and Family: Not on file   • Frequency of Social Gatherings with Friends and Family: Not on file   • Attends Taoist Services: Not on file   • Active Member of Clubs or Organizations: Not on file   • Attends Club or Organization Meetings: Not on file   • Marital Status: Not on file   Intimate Partner Violence:    • Fear of Current or Ex-Partner: Not on file   • Emotionally Abused: Not on file   • Physically Abused: Not on file   • Sexually Abused: Not on file  "  Housing Stability:    • Unable to Pay for Housing in the Last Year: Not on file   • Number of Places Lived in the Last Year: Not on file   • Unstable Housing in the Last Year: Not on file        Occupation: Retired,     CURRENT MEDICATIONS  Current Outpatient Medications   Medication Sig Dispense Refill   • triamcinolone acetonide (KENALOG) 0.1 % Cream      • meloxicam (MOBIC) 7.5 MG Tab TAKE 1 TABLET BY MOUTH EVERY DAY 30 Tablet 0   • ASPIRIN 81 PO Take 1 Tablet by mouth 3 times a week.     • DULoxetine (CYMBALTA) 30 MG Cap DR Particles Take 1 Capsule by mouth 2 times a day. 200 Capsule 3   • Calcium-Cholecalciferol (QC CALCIUM 500MG-D3 PO) Take  by mouth every day.     • Coenzyme Q10 (COQ-10) 100 MG Cap Take 300 mg by mouth every day.     • Multiple Vitamins-Minerals (PRESERVISION AREDS 2 PO) Take  by mouth every day.     • diphenhydrAMINE (BENADRYL) 25 MG Tab Take 25 mg by mouth as needed for Sleep.     • atorvastatin (LIPITOR) 40 MG Tab Take 1 Tablet by mouth every day. 90 Tablet 3   • estradiol (ESTRACE) 0.1 MG/GM vaginal cream Insert 1 gram vaginally 2 to 3 times a week at bedtime       No current facility-administered medications for this visit.       REVIEW OF SYSTEMS  Constitutional: Denies fevers, Denies weight changes  Ears/Nose/Throat/Mouth: Denies nasal congestion or sore throat   Cardiovascular: Denies chest pain  Respiratory: Denies shortness of breath, Denies cough  Gastrointestinal/Hepatic: Denies nausea, vomiting  Sleep: see HPI    Physical Examination:  Vitals/ General Appearance:   Weight/BMI: Body mass index is 31.24 kg/m².  /70 (BP Location: Left arm, Patient Position: Sitting, BP Cuff Size: Adult)   Pulse 67   Resp 16   Ht 1.626 m (5' 4\")   Wt 82.6 kg (182 lb)   SpO2 96%   Vitals:    02/08/22 1237   BP: 104/70   BP Location: Left arm   Patient Position: Sitting   BP Cuff Size: Adult   Pulse: 67   Resp: 16   SpO2: 96%   Weight: 82.6 kg (182 lb)   Height: 1.626 m (5' 4\")       Pt. " is alert and oriented to time, place and person. Cooperative and in no apparent distress.     Constitutional: Alert, no distress, well-groomed.  Skin: No rashes in visible areas.  Eye: Round. Conjunctiva clear, lids normal. No icterus.   ENT EXAM  Nasal alae/valves collapsible: Yes  Nasal septum deviation: Yes  Nasal turbinate hypertrophy: Left: Grade 1   Right: Grade 1  Hard palate narrow: Yes  Hard palate high: Yes  Soft palate/uvula (Mallampati score): 3  Tongue Scalloping: Yes  Retrognathia: No   Micrognathia: No   Cardiovascular:no murmus/gallops/rubs, normal S1 and S2 heart sounds, regular rate and rhythm  Pulmonary:Clear to auscultation, No wheezes, No crackles.  Neurologic:Awake, alert and oriented x 3, Normal age appropriate gait, No involuntary motions.  Extremities: No clubbing, cyanosis, or edema       ASSESSMENT AND PLAN   Garima Banda is a 71 y.o. female with hyperlipidemia, depression, osteoarthritis, and obesity.  Presents to Sleep Clinic for evaluation of suspected sleep apnea.    1. Garima Banda  has symptoms of Obstructive Sleep Apnea (CLAIRE). Garima Banda has symptoms of snoring, choking/gasping during sleep, witnessed apnea, dry mouth, morning headaches. These  interfere with activities of daily living.     Pt has risk factors for CLAIRE include obesity, age, and crowded oropharynx.     The pathophysiology of CLAIRE and the increased risk of cardiovascular morbidity from untreated CLAIRE is discussed in detail with the patient. She  also has depression which can be worsened by CLAIRE.      We have discussed diagnostic options including in-laboratory, attended polysomnography and home sleep testing. We have also discussed treatment options including airway pressurization, reconstructive otolaryngologic surgery, dental appliances and weight management.     Subsequently,treatment options will be discussed based on the diagnostic study. Meanwhile, She is urged to avoid supine sleep, weight gain and  alcoholic beverages since all of these can worsen CLAIRE. She is cautioned against drowsy driving. If She feels sleepy while driving, advised must pull over for a break/nap, rather than persist on the road, in the interest of Pt's own safety and that of others on the road.    Plan  -  She  will be scheduled for an overnight PSG to assess sleep related breathing disorder.  - Follow up 1-2 weeks after sleep study to discuss results and treatment options moving forward   -Advised to reach out via MyChart or by phone with any questions or concerns.     2.  Regarding treatment of other past medical problems and general health maintenance,  Pt is urged to follow up with PCP.      Please note portions of this record was created using voice recognition software. I have made every reasonable attempt to correct obvious errors, but I expect that there are errors of grammar and possibly content I did not discover before finalizing the note.

## 2022-02-14 ENCOUNTER — OFFICE VISIT (OUTPATIENT)
Dept: MEDICAL GROUP | Facility: MEDICAL CENTER | Age: 72
End: 2022-02-14
Payer: MEDICARE

## 2022-02-14 VITALS
HEART RATE: 71 BPM | OXYGEN SATURATION: 94 % | DIASTOLIC BLOOD PRESSURE: 78 MMHG | WEIGHT: 187.4 LBS | BODY MASS INDEX: 31.22 KG/M2 | HEIGHT: 65 IN | TEMPERATURE: 98.2 F | RESPIRATION RATE: 20 BRPM | SYSTOLIC BLOOD PRESSURE: 118 MMHG

## 2022-02-14 DIAGNOSIS — M85.88 OSTEOPENIA OF LUMBAR SPINE: ICD-10-CM

## 2022-02-14 DIAGNOSIS — G89.29 CHRONIC RIGHT HIP PAIN: ICD-10-CM

## 2022-02-14 DIAGNOSIS — M16.11 PRIMARY OSTEOARTHRITIS OF RIGHT HIP: ICD-10-CM

## 2022-02-14 DIAGNOSIS — M54.10 BACK PAIN WITH RADICULOPATHY: ICD-10-CM

## 2022-02-14 DIAGNOSIS — F33.40 RECURRENT MAJOR DEPRESSIVE DISORDER, IN REMISSION (HCC): ICD-10-CM

## 2022-02-14 DIAGNOSIS — Z91.89 AT RISK FOR BREAST CANCER: ICD-10-CM

## 2022-02-14 DIAGNOSIS — M16.12 PRIMARY OSTEOARTHRITIS OF LEFT HIP: ICD-10-CM

## 2022-02-14 DIAGNOSIS — M25.551 CHRONIC RIGHT HIP PAIN: ICD-10-CM

## 2022-02-14 DIAGNOSIS — R73.03 PREDIABETES: ICD-10-CM

## 2022-02-14 DIAGNOSIS — R07.0 THROAT DISCOMFORT: ICD-10-CM

## 2022-02-14 DIAGNOSIS — E78.49 OTHER HYPERLIPIDEMIA: ICD-10-CM

## 2022-02-14 PROCEDURE — G0402 INITIAL PREVENTIVE EXAM: HCPCS | Performed by: INTERNAL MEDICINE

## 2022-02-14 PROCEDURE — 8041 PR SCP AHA: Performed by: INTERNAL MEDICINE

## 2022-02-14 RX ORDER — OMEPRAZOLE 20 MG/1
20 CAPSULE, DELAYED RELEASE ORAL DAILY
Qty: 100 CAPSULE | Refills: 0 | Status: SHIPPED | OUTPATIENT
Start: 2022-02-14 | End: 2022-05-04 | Stop reason: SDUPTHER

## 2022-02-14 RX ORDER — DULOXETIN HYDROCHLORIDE 20 MG/1
20 CAPSULE, DELAYED RELEASE ORAL 2 TIMES DAILY
Qty: 200 CAPSULE | Refills: 1 | Status: SHIPPED | OUTPATIENT
Start: 2022-02-14 | End: 2022-05-04 | Stop reason: SDUPTHER

## 2022-02-14 ASSESSMENT — ENCOUNTER SYMPTOMS: GENERAL WELL-BEING: FAIR

## 2022-02-14 ASSESSMENT — PATIENT HEALTH QUESTIONNAIRE - PHQ9
3. TROUBLE FALLING OR STAYING ASLEEP OR SLEEPING TOO MUCH: SEVERAL DAYS
SUM OF ALL RESPONSES TO PHQ9 QUESTIONS 1 AND 2: 3
SUM OF ALL RESPONSES TO PHQ QUESTIONS 1-9: 9
1. LITTLE INTEREST OR PLEASURE IN DOING THINGS: SEVERAL DAYS
7. TROUBLE CONCENTRATING ON THINGS, SUCH AS READING THE NEWSPAPER OR WATCHING TELEVISION: NOT AT ALL
CLINICAL INTERPRETATION OF PHQ2 SCORE: 3
5. POOR APPETITE OR OVEREATING: 3 - NEARLY EVERY DAY
9. THOUGHTS THAT YOU WOULD BE BETTER OFF DEAD, OR OF HURTING YOURSELF: SEVERAL DAYS
5. POOR APPETITE OR OVEREATING: NEARLY EVERY DAY
6. FEELING BAD ABOUT YOURSELF - OR THAT YOU ARE A FAILURE OR HAVE LET YOURSELF OR YOUR FAMILY DOWN: NOT AL ALL
4. FEELING TIRED OR HAVING LITTLE ENERGY: SEVERAL DAYS
8. MOVING OR SPEAKING SO SLOWLY THAT OTHER PEOPLE COULD HAVE NOTICED. OR THE OPPOSITE, BEING SO FIGETY OR RESTLESS THAT YOU HAVE BEEN MOVING AROUND A LOT MORE THAN USUAL: NOT AT ALL
2. FEELING DOWN, DEPRESSED, IRRITABLE, OR HOPELESS: MORE THAN HALF THE DAYS
SUM OF ALL RESPONSES TO PHQ QUESTIONS 1-9: 9

## 2022-02-14 ASSESSMENT — ACTIVITIES OF DAILY LIVING (ADL): BATHING_REQUIRES_ASSISTANCE: 0

## 2022-02-14 ASSESSMENT — FIBROSIS 4 INDEX: FIB4 SCORE: 1.24

## 2022-02-14 NOTE — ASSESSMENT & PLAN NOTE
Patient reports being in more stressful environment recently with her family issues and upcoming surgery.  We discussed increasing the dose of Cymbalta from 30 mg daily to 40 mg daily.  New prescription for 20 mg capsules was sent to the pharmacy.  Patient denies any suicidal ideations or plan.  She has great support from her friends.  She will seek care immediately if she develops any suicidal plan.

## 2022-02-14 NOTE — PATIENT INSTRUCTIONS
Food Choices for Gastroesophageal Reflux Disease, Adult  When you have gastroesophageal reflux disease (GERD), the foods you eat and your eating habits are very important. Choosing the right foods can help ease your discomfort. Think about working with a nutrition specialist (dietitian) to help you make good choices.  What are tips for following this plan?    Meals  · Choose healthy foods that are low in fat, such as fruits, vegetables, whole grains, low-fat dairy products, and lean meat, fish, and poultry.  · Eat small meals often instead of 3 large meals a day. Eat your meals slowly, and in a place where you are relaxed. Avoid bending over or lying down until 2-3 hours after eating.  · Avoid eating meals 2-3 hours before bed.  · Avoid drinking a lot of liquid with meals.  · Cook foods using methods other than frying. Bake, grill, or broil food instead.  · Avoid or limit:  ? Chocolate.  ? Peppermint or spearmint.  ? Alcohol.  ? Pepper.  ? Black and decaffeinated coffee.  ? Black and decaffeinated tea.  ? Bubbly (carbonated) soft drinks.  ? Caffeinated energy drinks and soft drinks.  · Limit high-fat foods such as:  ? Fatty meat or fried foods.  ? Whole milk, cream, butter, or ice cream.  ? Nuts and nut butters.  ? Pastries, donuts, and sweets made with butter or shortening.  · Avoid foods that cause symptoms. These foods may be different for everyone. Common foods that cause symptoms include:  ? Tomatoes.  ? Oranges, jaylen, and limes.  ? Peppers.  ? Spicy food.  ? Onions and garlic.  ? Vinegar.  Lifestyle  · Maintain a healthy weight. Ask your doctor what weight is healthy for you. If you need to lose weight, work with your doctor to do so safely.  · Exercise for at least 30 minutes for 5 or more days each week, or as told by your doctor.  · Wear loose-fitting clothes.  · Do not smoke. If you need help quitting, ask your doctor.  · Sleep with the head of your bed higher than your feet. Use a wedge under the  mattress or blocks under the bed frame to raise the head of the bed.  Summary  · When you have gastroesophageal reflux disease (GERD), food and lifestyle choices are very important in easing your symptoms.  · Eat small meals often instead of 3 large meals a day. Eat your meals slowly, and in a place where you are relaxed.  · Limit high-fat foods such as fatty meat or fried foods.  · Avoid bending over or lying down until 2-3 hours after eating.  · Avoid peppermint and spearmint, caffeine, alcohol, and chocolate.  This information is not intended to replace advice given to you by your health care provider. Make sure you discuss any questions you have with your health care provider.  Document Released: 06/18/2013 Document Revised: 04/09/2020 Document Reviewed: 01/23/2018  Elsevier Patient Education © 2020 [employer]

## 2022-02-14 NOTE — PROGRESS NOTES
Chief Complaint   Patient presents with   • Annual Wellness Visit       HPI: Garima is a 71 y.o. here for Medicare Annual Wellness Visit    She has chronic bilateral hip osteoarthritis and there is a plan for left JACOBO in March 2022 by her orthopedic surgeon.  She also reports being under more stress recently as she has to move out from her brother's home.  She is also eating a lot more recently due to stress.  She has also noticed some throat discomfort on and off, similar to acid reflux symptoms.     Patient Active Problem List    Diagnosis Date Noted   • Throat discomfort 02/14/2022   • Recurrent major depressive disorder, in remission ([facility]) 12/28/2021   • Degenerative disc disease, lumbar 12/28/2021   • Primary osteoarthritis of left hip 12/06/2021   • Incontinence of urine in female 11/10/2021   • Prediabetes 11/10/2021   • Spondylolisthesis 07/13/2021   • Spinal stenosis of lumbar region 07/13/2021   • Second hand smoke exposure 05/13/2021   • BMI 30.0-30.9,adult 05/13/2021   • Back pain with radiculopathy 05/13/2021   • Peripheral venous insufficiency 01/10/2020   • Osteopenia of lumbar spine 05/27/2015   • Right carpal tunnel syndrome 05/27/2015   • Hyperlipidemia 01/27/2015   • Osteoarthritis of right hip 01/27/2015   • Vitiligo 01/27/2015       Current Outpatient Medications   Medication Sig Dispense Refill   • DULoxetine (CYMBALTA) 20 MG Cap DR Particles Take 1 Capsule by mouth 2 times a day. 200 Capsule 1   • Mirabegron ER 25 MG TABLET SR 24 HR Take 1 Tablet by mouth every day. 100 Tablet 1   • omeprazole (PRILOSEC) 20 MG delayed-release capsule Take 1 Capsule by mouth every day. 100 Capsule 0   • triamcinolone acetonide (KENALOG) 0.1 % Cream      • meloxicam (MOBIC) 7.5 MG Tab TAKE 1 TABLET BY MOUTH EVERY DAY 30 Tablet 0   • ASPIRIN 81 PO Take 1 Tablet by mouth 3 times a week.     • Calcium-Cholecalciferol (QC CALCIUM 500MG-D3 PO) Take  by mouth every day.     • Coenzyme Q10 (COQ-10) 100 MG Cap Take  300 mg by mouth every day.     • Multiple Vitamins-Minerals (PRESERVISION AREDS 2 PO) Take  by mouth every day.     • diphenhydrAMINE (BENADRYL) 25 MG Tab Take 25 mg by mouth as needed for Sleep.     • atorvastatin (LIPITOR) 40 MG Tab Take 1 Tablet by mouth every day. 90 Tablet 3   • estradiol (ESTRACE) 0.1 MG/GM vaginal cream Insert 1 gram vaginally 2 to 3 times a week at bedtime       No current facility-administered medications for this visit.      Patient is taking medications as noted in medication list.  Current supplements as per medication list.     Allergies: Patient has no active allergies.    Current social contact/activities: Meeting with parents, has great support    Is patient current with immunizations?  Due for Shingrix    She  reports that she has never smoked. She has never used smokeless tobacco. She reports current alcohol use of about 0.6 oz of alcohol per week. She reports previous drug use. Drugs: Marijuana and Inhaled.  Counseling given: Not Answered  Comment: second hand smoke casinos x 28 years    DPA/Advanced directive: Patient has Advanced Directive on file.     ROS:    Gait: Uses no assistive device   Ostomy: No   Other tubes: No   Amputations: No   Chronic oxygen use No   Last eye exam 2021  Wears hearing aids: No   : Reports urinary leakage during the last 6 months that has not interfered at all with their daily activities or sleep.    Screening:    Depression Screening  Little interest or pleasure in doing things?  1 - several days  Feeling down, depressed, or hopeless? 2 - more than half the days  Trouble falling or staying asleep, or sleeping too much?  1 - several days  Feeling tired or having little energy?  1 - several days  Poor appetite or overeating?  3 - nearly every day  Feeling bad about yourself - or that you are a failure or have let yourself or your family down? 0 - not at all  Trouble concentrating on things, such as reading the newspaper or watching television? 0 -  not at all  Moving or speaking so slowly that other people could have noticed.  Or the opposite - being so fidgety or restless that you have been moving around a lot more than usual?  0 - not at all  Thoughts that you would be better off dead, or of hurting yourself?  1 - several days  Patient Health Questionnaire Score: 9    If depressive symptoms identified deferred to follow up visit unless specifically addressed in assessment and plan.    Interpretation of PHQ-9 Total Score   Score Severity   1-4 No Depression   5-9 Mild Depression   10-14 Moderate Depression   15-19 Moderately Severe Depression   20-27 Severe Depression      Screening for Cognitive Impairment  Three Minute Recall (captain, royal, picture)  3/3    Draw clock face with all 12 numbers and set the hands to show 5 past 8.  Yes    If cognitive concerns identified, deferred for follow up unless specifically addressed in assessment and plan.    Fall Risk Assessment  Has the patient had two or more falls in the last year or any fall with injury in the last year?  No  If fall risk identified, deferred for follow up unless specifically addressed in assessment and plan.    Safety Assessment  Throw rugs on floor.  Yes  Handrails on all stairs.  Yes  Good lighting in all hallways.  Yes  Difficulty hearing.  No  Patient counseled about all safety risks that were identified.    Functional Assessment ADLs  Are there any barriers preventing you from cooking for yourself or meeting nutritional needs?  No.    Are there any barriers preventing you from driving safely or obtaining transportation?  No.    Are there any barriers preventing you from using a telephone or calling for help?  No.    Are there any barriers preventing you from shopping?  No.    Are there any barriers preventing you from taking care of your own finances?  No.    Are there any barriers preventing you from managing your medications?    No.    Are there any barriers preventing you from  showering, bathing or dressing yourself?  No.    Are you currently engaging in any exercise or physical activity?  Yes.     What is your perception of your health?  Fair.    Health Maintenance Summary          Overdue - IMM ZOSTER VACCINES (2 of 3) Overdue since 3/24/2015    01/27/2015  Imm Admin: Zoster Vaccine Live (ZVL) (Zostavax) - HISTORICAL DATA          MAMMOGRAM (Yearly) Tentatively due on 1/31/2023 01/31/2022  MA-SCREENING MAMMO BILAT W/TOMOSYNTHESIS W/CAD          Annual Wellness Visit (Every 366 Days) Next due on 2/15/2023    02/14/2022  Level of Service: ANNUAL WELLNESS VISIT-INCLUDES PPPS SUBSEQUE*          IMM DTaP/Tdap/Td Vaccine (2 - Td or Tdap) Next due on 1/13/2025 01/13/2015  Imm Admin: Tdap Vaccine    01/07/2015  Imm Admin: TD Vaccine          BONE DENSITY (Every 5 Years) Tentatively due on 5/20/2026 05/20/2021  DS-BONE DENSITY STUDY (DEXA)          COLORECTAL CANCER SCREENING (COLONOSCOPY - Every 5 Years) Next due on 7/12/2026 07/12/2021  REFERRAL TO GI FOR COLONOSCOPY          IMM HEP B VACCINE (Series Information) Aged Out    09/01/2005  Imm Admin: Hep A/HEP B Combined Vaccine (TwinRix)    04/01/2005  Imm Admin: Hep A/HEP B Combined Vaccine (TwinRix)    03/01/2005  Imm Admin: Hep A/HEP B Combined Vaccine (TwinRix)          IMM PNEUMOCOCCAL VACCINE: 65+ Years (Series Information) Completed    11/21/2018  Imm Admin: Pneumococcal polysaccharide vaccine (PPSV-23)    08/07/2017  Imm Admin: Pneumococcal Conjugate Vaccine (Prevnar/PCV-13)          HEPATITIS C SCREENING  Completed    06/08/2021  HEP C VIRUS ANTIBODY          IMM INFLUENZA (Series Information) Completed    10/10/2021  Imm Admin: Influenza Vaccine Adult HD    10/02/2020  Imm Admin: Influenza, Unspecified - HISTORICAL DATA    11/12/2019  Imm Admin: Influenza (IM) Preservative Free - HISTORICAL DATA    10/15/2018  Imm Admin: Influenza, Unspecified - HISTORICAL DATA    01/06/2018  Imm Admin: Influenza, Unspecified - HISTORICAL  DATA    Only the first 5 history entries have been loaded, but more history exists.          COVID-19 Vaccine (Series Information) Completed    11/20/2021  Imm Admin: Moderna SARS-CoV-2 Vaccine    03/11/2021  Imm Admin: Moderna SARS-CoV-2 Vaccine    02/11/2021  Imm Admin: Moderna SARS-CoV-2 Vaccine          IMM MENINGOCOCCAL VACCINE (MCV4) (Series Information) Aged Out    No completion history exists for this topic.          Discontinued - PAP SMEAR  Discontinued    No completion history exists for this topic.              Patient Care Team:  Myranda Page M.D. as PCP - General (Internal Medicine)    Social History     Tobacco Use   • Smoking status: Never Smoker   • Smokeless tobacco: Never Used   • Tobacco comment: second hand smoke casinos x 28 years   Vaping Use   • Vaping Use: Never used   Substance Use Topics   • Alcohol use: Yes     Alcohol/week: 0.6 oz     Types: 1 Standard drinks or equivalent per week     Comment: zero to two drinks per week   • Drug use: Not Currently     Types: Marijuana, Inhaled     Comment: I smoke pot maybe three to four times a year     Family History   Problem Relation Age of Onset   • Cancer Mother         breast. lung   • Breast Cancer Mother    • Lung Cancer Mother    • Cancer Father         colon   • Colon Cancer Father    • Lung Cancer Father    • Diabetes Brother    • Prostate cancer Brother      She  has a past medical history of Arthritis, Back pain, Back pain, Chickenpox, Closed fracture of distal end of right radius (3/25/2015), Cough, Depression, Elevated liver enzymes, Frequent headaches, Frequent urination, Hay fever, High cholesterol, Hyperlipidemia, Insomnia, Low blood pressure, Morning headache, Nasal drainage, Obesity, Pain, Painful joint, Rhinitis, Ringing in ears, Sleep apnea, Toothache, Urinary incontinence, and Wears glasses.   Past Surgical History:   Procedure Laterality Date   • PB TOTAL HIP ARTHROPLASTY Right 10/20/2021    Procedure:  "ARTHROPLASTY, HIP, TOTAL;  Surgeon: Chavo Otto M.D.;  Location: SURGERY [facility];  Service: Orthopedics   • CARPAL TUNNEL RELEASE     • HIP REPLACEMENT, TOTAL     • OTHER      trigger finger R; R radial fracture repair no hardare (pins taken out), CTR b/l      Exam:   /78 (BP Location: Right arm, Patient Position: Sitting, BP Cuff Size: Adult)   Pulse 71   Temp 36.8 °C (98.2 °F) (Temporal)   Resp 20   Ht 1.64 m (5' 4.57\")   Wt 85 kg (187 lb 6.4 oz)   SpO2 94%  Body mass index is 31.6 kg/m².    Hearing good.    Dentition good  Alert, oriented in no acute distress  Eye contact is good, speech goal directed, affect calm    Assessment and Plan. The following treatment and monitoring plan is recommended:    Problem List Items Addressed This Visit     Back pain with radiculopathy    Relevant Medications    DULoxetine (CYMBALTA) 20 MG Cap DR Particles    BMI 30.0-30.9,adult     This is a chronic problem, current BMI is 31.6 mg/m².  Patient knows that she needs to limit her portions, however she is eating a lot more recently as she is under a lot of stress with upcoming surgery and she has to move out from her brothers home.         Hyperlipidemia     This is a chronic problem, well-controlled on current regimen Lipitor 40 mg tablet daily.  Lipid panel reviewed and it is within normal limits.  Continue Lipitor 40 mg tablet daily without changes.         Osteoarthritis of right hip     Status post right hip arthroplasty on October 20, 2021, uncomplicated postsurgical course.  Patient reports some discomfort in her right groin area and recommended to discuss this with her orthopedic surgeon.         Osteopenia of lumbar spine (Chronic)     Chronic and stable, no fractures continue calcium and vitamin D supplementation         Prediabetes     Continue dietary and lifestyle modifications.  Patient reports no polydipsia, polyuria, burning sensation in her feet.         Primary osteoarthritis of left hip " (Chronic)     This is a chronic problem, plan for left-sided JACOBO on March 16, 2022.         Recurrent major depressive disorder, in remission (HCC)     Patient reports being in more stressful environment recently with her family issues and upcoming surgery.  We discussed increasing the dose of Cymbalta from 30 mg daily to 40 mg daily.  New prescription for 20 mg capsules was sent to the pharmacy.  Patient denies any suicidal ideations or plan.  She has great support from her friends.  She will seek care immediately if she develops any suicidal plan.         Relevant Medications    DULoxetine (CYMBALTA) 20 MG Cap DR Particles    Throat discomfort     Patient does have a history of GERD, however the symptoms are slightly different from before.  She reports dry mouth symptoms and they are associated with cough during the night.  We will do trial of PPI and patient will let me know if her symptoms fail to worsen to improve.               At risk for breast cancer        Relevant Orders    Referral to Genetic Research Studies        Services suggested: No services needed at this time  Health Care Screening recommendations as per orders if indicated.  Referrals offered: PT/OT/Nutrition counseling/Behavioral Health/Smoking cessation as per orders if indicated.    Discussion today about general wellness and lifestyle habits:    · Prevent falls and reduce trip hazards; Cautioned about securing or removing rugs.  · Have a working fire alarm and carbon monoxide detector;   · Engage in regular physical activity and social activities.     Annual Health Assessment Questions:    1.  Are you currently engaging in any exercise or physical activity? Yes    2.  How would you describe your mood or emotional well-being today? good    3.  Have you had any falls in the last year? No    4.  Have you noticed any problems with your balance or had difficulty walking? No    5.  In the last six months have you experienced any leakage of urine?  Yes    6. DPA/Advanced Directive: Patient has Advanced Directive on file.     Follow-up: Return in about 3 months (around 5/14/2022), or if symptoms worsen or fail to improve.

## 2022-02-14 NOTE — ASSESSMENT & PLAN NOTE
Status post right hip arthroplasty on October 20, 2021, uncomplicated postsurgical course.  Patient reports some discomfort in her right groin area and recommended to discuss this with her orthopedic surgeon.

## 2022-02-14 NOTE — ASSESSMENT & PLAN NOTE
This is a chronic problem, well-controlled on current regimen Lipitor 40 mg tablet daily.  Lipid panel reviewed and it is within normal limits.  Continue Lipitor 40 mg tablet daily without changes.

## 2022-02-14 NOTE — ASSESSMENT & PLAN NOTE
This is a chronic problem, current BMI is 31.6 mg/m².  Patient knows that she needs to limit her portions, however she is eating a lot more recently as she is under a lot of stress with upcoming surgery and she has to move out from her brothers home.

## 2022-02-14 NOTE — ASSESSMENT & PLAN NOTE
Patient does have a history of GERD, however the symptoms are slightly different from before.  She reports dry mouth symptoms and they are associated with cough during the night.  We will do trial of PPI and patient will let me know if her symptoms fail to worsen to improve.

## 2022-02-14 NOTE — ASSESSMENT & PLAN NOTE
Continue dietary and lifestyle modifications.  Patient reports no polydipsia, polyuria, burning sensation in her feet.

## 2022-02-25 RX ORDER — MELOXICAM 7.5 MG/1
7.5 TABLET ORAL DAILY
Qty: 30 TABLET | Refills: 0 | Status: SHIPPED | OUTPATIENT
Start: 2022-02-25 | End: 2022-03-31

## 2022-03-01 ENCOUNTER — SLEEP STUDY (OUTPATIENT)
Dept: SLEEP MEDICINE | Facility: MEDICAL CENTER | Age: 72
End: 2022-03-01
Attending: STUDENT IN AN ORGANIZED HEALTH CARE EDUCATION/TRAINING PROGRAM
Payer: MEDICARE

## 2022-03-01 DIAGNOSIS — G47.30 SLEEP DISORDER BREATHING: ICD-10-CM

## 2022-03-01 DIAGNOSIS — G47.00 FREQUENT NOCTURNAL AWAKENING: ICD-10-CM

## 2022-03-01 PROCEDURE — 95810 POLYSOM 6/> YRS 4/> PARAM: CPT | Performed by: STUDENT IN AN ORGANIZED HEALTH CARE EDUCATION/TRAINING PROGRAM

## 2022-03-02 ENCOUNTER — PRE-ADMISSION TESTING (OUTPATIENT)
Dept: ADMISSIONS | Facility: MEDICAL CENTER | Age: 72
End: 2022-03-02
Attending: ORTHOPAEDIC SURGERY
Payer: MEDICARE

## 2022-03-02 DIAGNOSIS — M16.12 PRIMARY OSTEOARTHRITIS OF LEFT HIP: ICD-10-CM

## 2022-03-02 DIAGNOSIS — Z01.812 PRE-OPERATIVE LABORATORY EXAMINATION: ICD-10-CM

## 2022-03-02 LAB
ANION GAP SERPL CALC-SCNC: 11 MMOL/L (ref 7–16)
BUN SERPL-MCNC: 14 MG/DL (ref 8–22)
CALCIUM SERPL-MCNC: 9.4 MG/DL (ref 8.4–10.2)
CHLORIDE SERPL-SCNC: 105 MMOL/L (ref 96–112)
CO2 SERPL-SCNC: 25 MMOL/L (ref 20–33)
CREAT SERPL-MCNC: 0.53 MG/DL (ref 0.5–1.4)
ERYTHROCYTE [DISTWIDTH] IN BLOOD BY AUTOMATED COUNT: 45.9 FL (ref 35.9–50)
GLUCOSE SERPL-MCNC: 83 MG/DL (ref 65–99)
HCT VFR BLD AUTO: 38 % (ref 37–47)
HGB BLD-MCNC: 12.1 G/DL (ref 12–16)
MCH RBC QN AUTO: 27.2 PG (ref 27–33)
MCHC RBC AUTO-ENTMCNC: 31.8 G/DL (ref 33.6–35)
MCV RBC AUTO: 85.4 FL (ref 81.4–97.8)
PLATELET # BLD AUTO: 280 K/UL (ref 164–446)
PMV BLD AUTO: 10.1 FL (ref 9–12.9)
POTASSIUM SERPL-SCNC: 4.2 MMOL/L (ref 3.6–5.5)
RBC # BLD AUTO: 4.45 M/UL (ref 4.2–5.4)
SODIUM SERPL-SCNC: 141 MMOL/L (ref 135–145)
WBC # BLD AUTO: 5.2 K/UL (ref 4.8–10.8)

## 2022-03-02 PROCEDURE — 80048 BASIC METABOLIC PNL TOTAL CA: CPT

## 2022-03-02 PROCEDURE — 36415 COLL VENOUS BLD VENIPUNCTURE: CPT

## 2022-03-02 PROCEDURE — 87640 STAPH A DNA AMP PROBE: CPT

## 2022-03-02 PROCEDURE — 85027 COMPLETE CBC AUTOMATED: CPT

## 2022-03-02 PROCEDURE — 87641 MR-STAPH DNA AMP PROBE: CPT

## 2022-03-02 RX ORDER — CEFAZOLIN SODIUM IN 0.9 % NACL 2 G/100 ML
2 PLASTIC BAG, INJECTION (ML) INTRAVENOUS ONCE
Status: CANCELLED | OUTPATIENT
Start: 2022-03-02 | End: 2022-03-02

## 2022-03-02 ASSESSMENT — FIBROSIS 4 INDEX: FIB4 SCORE: 1.24

## 2022-03-02 NOTE — PROCEDURES
MONTAGE: Standard  STUDY TYPE: Diagnostic    RECORDING TECHNIQUE:   After the scalp was prepared, gold plated electrodes were applied to the scalp according to the International 10-20 System. EEG (electroencephalogram) was continuously monitored from the O1-M2, O2-M1, C3-M2, C4-M1, F3-M2, and F4-M1. EOGs (electrooculograms) were monitored by electrodes placed at the left and right outer canthi. Chin EMG (electromyogram) was monitored by electrodes placed on the mentalis and sub-mentalis muscles. Nasal and oral airflow were monitored using a triple port thermocouple as well as oronasal pressure transducer. Respiratory effort was measured by inductive plethysmography technology employing abdominal and thoracic belts. Blood oxygen saturation and pulse were monitored by pulse oximetry. Heart rhythm was monitored by surface electrocardiogram. Leg EMG was studied using surface electrodes placed on left and right anterior tibialis. A microphone was used to monitor tracheal sounds and snoring. Body position was monitored and documented by technician observation.     SCORING CRITERIA:   A modification of the AASM manual for scoring of sleep and associated events was used. Obstructive apneas were scored by cessation of airflow for at least 10 seconds with continuing respiratory effort. Central apneas were scored by cessation of airflow for at least 10 seconds with no respiratory effort. Hypopneas were scored by a 30% or more reduction in airflow for at least 10 seconds accompanied by arterial oxygen desaturation of 3% or an arousal. For CMS (Medicare) patients, per AASM rule 1B, hypopneas are scored by 30% with mild reduction in airflow for at least 10 seconds accompanied by arterial saturation decreased at 4%.    Study start time was 10:52:23 PM. Diagnostic recording time was 6h 35.5m with a total sleep time of 3h 55.0m resulting in a sleep efficiency of 59.42%%. Sleep latency from the start of the study was 09 minutes and  the latency from sleep to REM was 103 minutes. In total,102 arousals were scored for an arousal index of 26.0.  Respiratory:  There were a total of 22 apneas consisting of 20 obstructive apneas, 0 mixed apneas, and 2 central apneas. A total of 161 hypopneas were scored. The apnea index was 5.62 per hour and the hypopnea index was 41.11 per hour resulting in an overall AHI of 46.72. AHI during rem was 45.0 and AHI while supine was 0.00.  Oximetry:  There was a mean oxygen saturation of 90.0%. The minimum oxygen saturation in NREM was 72.0 % and in REM was 80.0%. The patient spent 114.5 minutes of TST with SaO2 <88%.  Cardiac:  The highest heart rate seen while awake was 94 BPM while the highest heart rate during sleep was 83 BPM with an average sleeping heart rate of 71 BPM.  Limb Movements:  There were a total of 145 PLMs during sleep which resulted in a PLMS index of 37.0. Of these, 67 were associated with arousals which resulted in a PLMS arousal index of 17.1.    Impression:  1.  Severe obstructive sleep apnea overall AHI of 46.7  2.  Nocturnal hypoxia likely secondary to untreated sleep apnea minimum oxygen saturation 72% time below or at 88% saturation of 115 minutes  3.  Due to poor sleep efficiency split-night protocol was not initiated  4.  PLM's noted  5.  Snoring was noted during study.      Recommendations:  I recommend that the patient should return for a CPAP/BiPAP titration due to the severity of obstructive sleep apnea and sleep-related hypoxia.     In some cases alternative treatment options may be proven effective in resolving sleep apnea. These options include upper airway surgery, the use of a dental orthotic (mild to moderate CLAIRE), weight loss orpositional therapy. Clinical correlation is required. In general patients with sleep apnea are advised to avoid alcohol, sedatives and not to operate a motor vehicle while drowsy.  Untreated sleep apnea increases the risk for cardiovascular and  neurovascular disease.

## 2022-03-02 NOTE — OR NURSING
"Preadmit appointment: \" Preparing for your Procedure information\" sheet given to patient with verbal and written instructions. Patient instructed to continue prescribed medications through the day before surgery, instructed to take the following medications the day of surgery per anesthesia protocol:  TYLENOL. Pt states she takes all her meds in the evening.            Pt states had a sleep study done 03/01/22 and has not received results. Written and verbal instructions given that if she is to get a machine to bring it DOS. Rated as a 4 on the CLAIRE assessment.  "

## 2022-03-02 NOTE — DISCHARGE PLANNING
DISCHARGE PLANNING NOTE - TOTAL JOINT    Procedure: Procedure(s):  ARTHROPLASTY, HIP, TOTAL  Procedure Date: 3/16/2022  Insurance: Payor: [facility] SENIOR CARE PLUS / Plan: [facility] SCP [place] PREFERRED 21  / Product Type: Medicare Advantage /    Equipment currently available at home?  front-wheel walker and shower chair  Steps into the home? 1  Steps within the home? 0  Toilet height? Standard  Type of shower? walk-in shower  Who will be with you during your recovery? Brother and sister-in-law.  Is Outpatient Physical Therapy set up after surgery? No   Did you take the Total Joint Class and where? Yes  Planning same day discharge?Yes     This writer met with pt during her preadmission appt. This writer met with pt this past October for surgery on her right hip. Pt states she had an uneventful recovery. No dc needs identified at this time. Anticipate dc to home without barriers.

## 2022-03-03 LAB
SCCMEC + MECA PNL NOSE NAA+PROBE: NEGATIVE
SCCMEC + MECA PNL NOSE NAA+PROBE: NEGATIVE

## 2022-03-11 ENCOUNTER — APPOINTMENT (OUTPATIENT)
Dept: ADMISSIONS | Facility: MEDICAL CENTER | Age: 72
End: 2022-03-11
Attending: ORTHOPAEDIC SURGERY
Payer: MEDICARE

## 2022-03-11 DIAGNOSIS — Z01.812 PRE-OPERATIVE LABORATORY EXAMINATION: ICD-10-CM

## 2022-03-11 PROCEDURE — C9803 HOPD COVID-19 SPEC COLLECT: HCPCS

## 2022-03-11 PROCEDURE — U0003 INFECTIOUS AGENT DETECTION BY NUCLEIC ACID (DNA OR RNA); SEVERE ACUTE RESPIRATORY SYNDROME CORONAVIRUS 2 (SARS-COV-2) (CORONAVIRUS DISEASE [COVID-19]), AMPLIFIED PROBE TECHNIQUE, MAKING USE OF HIGH THROUGHPUT TECHNOLOGIES AS DESCRIBED BY CMS-2020-01-R: HCPCS

## 2022-03-11 PROCEDURE — U0005 INFEC AGEN DETEC AMPLI PROBE: HCPCS

## 2022-03-12 LAB
SARS-COV-2 RNA RESP QL NAA+PROBE: NOTDETECTED
SPECIMEN SOURCE: NORMAL

## 2022-03-17 ENCOUNTER — HOSPITAL ENCOUNTER (OUTPATIENT)
Facility: MEDICAL CENTER | Age: 72
End: 2022-03-18
Attending: ORTHOPAEDIC SURGERY | Admitting: ORTHOPAEDIC SURGERY
Payer: MEDICARE

## 2022-03-17 ENCOUNTER — APPOINTMENT (OUTPATIENT)
Dept: RADIOLOGY | Facility: MEDICAL CENTER | Age: 72
End: 2022-03-17
Attending: ORTHOPAEDIC SURGERY
Payer: MEDICARE

## 2022-03-17 ENCOUNTER — ANESTHESIA EVENT (OUTPATIENT)
Dept: SURGERY | Facility: MEDICAL CENTER | Age: 72
End: 2022-03-17
Payer: MEDICARE

## 2022-03-17 ENCOUNTER — ANESTHESIA (OUTPATIENT)
Dept: SURGERY | Facility: MEDICAL CENTER | Age: 72
End: 2022-03-17
Payer: MEDICARE

## 2022-03-17 DIAGNOSIS — M16.12 PRIMARY OSTEOARTHRITIS OF LEFT HIP: ICD-10-CM

## 2022-03-17 PROBLEM — Z96.642 STATUS POST LEFT HIP REPLACEMENT: Status: ACTIVE | Noted: 2022-03-17

## 2022-03-17 LAB
ABO GROUP BLD: NORMAL
BLD GP AB SCN SERPL QL: NORMAL
RH BLD: NORMAL

## 2022-03-17 PROCEDURE — 700101 HCHG RX REV CODE 250: Performed by: ANESTHESIOLOGY

## 2022-03-17 PROCEDURE — 160031 HCHG SURGERY MINUTES - 1ST 30 MINS LEVEL 5: Performed by: ORTHOPAEDIC SURGERY

## 2022-03-17 PROCEDURE — 160035 HCHG PACU - 1ST 60 MINS PHASE I: Performed by: ORTHOPAEDIC SURGERY

## 2022-03-17 PROCEDURE — 86901 BLOOD TYPING SEROLOGIC RH(D): CPT

## 2022-03-17 PROCEDURE — 501838 HCHG SUTURE GENERAL: Performed by: ORTHOPAEDIC SURGERY

## 2022-03-17 PROCEDURE — 160048 HCHG OR STATISTICAL LEVEL 1-5: Performed by: ORTHOPAEDIC SURGERY

## 2022-03-17 PROCEDURE — 97162 PT EVAL MOD COMPLEX 30 MIN: CPT

## 2022-03-17 PROCEDURE — G0378 HOSPITAL OBSERVATION PER HR: HCPCS

## 2022-03-17 PROCEDURE — 27130 TOTAL HIP ARTHROPLASTY: CPT | Mod: 80ROC,LT | Performed by: STUDENT IN AN ORGANIZED HEALTH CARE EDUCATION/TRAINING PROGRAM

## 2022-03-17 PROCEDURE — 94760 N-INVAS EAR/PLS OXIMETRY 1: CPT

## 2022-03-17 PROCEDURE — 700111 HCHG RX REV CODE 636 W/ 250 OVERRIDE (IP): Performed by: ORTHOPAEDIC SURGERY

## 2022-03-17 PROCEDURE — 700111 HCHG RX REV CODE 636 W/ 250 OVERRIDE (IP): Performed by: ANESTHESIOLOGY

## 2022-03-17 PROCEDURE — 700102 HCHG RX REV CODE 250 W/ 637 OVERRIDE(OP): Performed by: ANESTHESIOLOGY

## 2022-03-17 PROCEDURE — 502000 HCHG MISC OR IMPLANTS RC 0278: Performed by: ORTHOPAEDIC SURGERY

## 2022-03-17 PROCEDURE — 96375 TX/PRO/DX INJ NEW DRUG ADDON: CPT

## 2022-03-17 PROCEDURE — 700102 HCHG RX REV CODE 250 W/ 637 OVERRIDE(OP): Performed by: ORTHOPAEDIC SURGERY

## 2022-03-17 PROCEDURE — C1776 JOINT DEVICE (IMPLANTABLE): HCPCS | Performed by: ORTHOPAEDIC SURGERY

## 2022-03-17 PROCEDURE — A9270 NON-COVERED ITEM OR SERVICE: HCPCS | Performed by: ANESTHESIOLOGY

## 2022-03-17 PROCEDURE — 700101 HCHG RX REV CODE 250: Performed by: ORTHOPAEDIC SURGERY

## 2022-03-17 PROCEDURE — 160009 HCHG ANES TIME/MIN: Performed by: ORTHOPAEDIC SURGERY

## 2022-03-17 PROCEDURE — C1713 ANCHOR/SCREW BN/BN,TIS/BN: HCPCS | Performed by: ORTHOPAEDIC SURGERY

## 2022-03-17 PROCEDURE — 27130 TOTAL HIP ARTHROPLASTY: CPT | Mod: LT | Performed by: ORTHOPAEDIC SURGERY

## 2022-03-17 PROCEDURE — 96365 THER/PROPH/DIAG IV INF INIT: CPT

## 2022-03-17 PROCEDURE — 86900 BLOOD TYPING SEROLOGIC ABO: CPT

## 2022-03-17 PROCEDURE — 96376 TX/PRO/DX INJ SAME DRUG ADON: CPT | Mod: XU

## 2022-03-17 PROCEDURE — A9270 NON-COVERED ITEM OR SERVICE: HCPCS | Performed by: ORTHOPAEDIC SURGERY

## 2022-03-17 PROCEDURE — 700105 HCHG RX REV CODE 258: Performed by: ORTHOPAEDIC SURGERY

## 2022-03-17 PROCEDURE — 86850 RBC ANTIBODY SCREEN: CPT

## 2022-03-17 PROCEDURE — 72170 X-RAY EXAM OF PELVIS: CPT

## 2022-03-17 PROCEDURE — 160002 HCHG RECOVERY MINUTES (STAT): Performed by: ORTHOPAEDIC SURGERY

## 2022-03-17 PROCEDURE — 160042 HCHG SURGERY MINUTES - EA ADDL 1 MIN LEVEL 5: Performed by: ORTHOPAEDIC SURGERY

## 2022-03-17 DEVICE — IMPLANTABLE DEVICE: Type: IMPLANTABLE DEVICE | Site: HIP | Status: FUNCTIONAL

## 2022-03-17 DEVICE — SCREW BONE 6.5 X 40 MM TRIDENT LP HEX: Type: IMPLANTABLE DEVICE | Site: HIP | Status: FUNCTIONAL

## 2022-03-17 RX ORDER — HYDROMORPHONE HYDROCHLORIDE 2 MG/ML
INJECTION, SOLUTION INTRAMUSCULAR; INTRAVENOUS; SUBCUTANEOUS PRN
Status: DISCONTINUED | OUTPATIENT
Start: 2022-03-17 | End: 2022-03-17 | Stop reason: SURG

## 2022-03-17 RX ORDER — OXYCODONE HYDROCHLORIDE 5 MG/1
5 TABLET ORAL
Status: DISCONTINUED | OUTPATIENT
Start: 2022-03-17 | End: 2022-03-18 | Stop reason: HOSPADM

## 2022-03-17 RX ORDER — OXYCODONE HCL 5 MG/5 ML
10 SOLUTION, ORAL ORAL
Status: COMPLETED | OUTPATIENT
Start: 2022-03-17 | End: 2022-03-17

## 2022-03-17 RX ORDER — SCOLOPAMINE TRANSDERMAL SYSTEM 1 MG/1
1 PATCH, EXTENDED RELEASE TRANSDERMAL
Status: DISCONTINUED | OUTPATIENT
Start: 2022-03-17 | End: 2022-03-18 | Stop reason: HOSPADM

## 2022-03-17 RX ORDER — ONDANSETRON 2 MG/ML
INJECTION INTRAMUSCULAR; INTRAVENOUS PRN
Status: DISCONTINUED | OUTPATIENT
Start: 2022-03-17 | End: 2022-03-17 | Stop reason: SURG

## 2022-03-17 RX ORDER — AMOXICILLIN 250 MG
1 CAPSULE ORAL
Status: DISCONTINUED | OUTPATIENT
Start: 2022-03-17 | End: 2022-03-18 | Stop reason: HOSPADM

## 2022-03-17 RX ORDER — OXYBUTYNIN CHLORIDE 5 MG/1
5 TABLET, EXTENDED RELEASE ORAL EVERY MORNING
Status: DISCONTINUED | OUTPATIENT
Start: 2022-03-17 | End: 2022-03-18 | Stop reason: HOSPADM

## 2022-03-17 RX ORDER — DEXAMETHASONE SODIUM PHOSPHATE 4 MG/ML
INJECTION, SOLUTION INTRA-ARTICULAR; INTRALESIONAL; INTRAMUSCULAR; INTRAVENOUS; SOFT TISSUE PRN
Status: DISCONTINUED | OUTPATIENT
Start: 2022-03-17 | End: 2022-03-17 | Stop reason: SURG

## 2022-03-17 RX ORDER — KETOROLAC TROMETHAMINE 30 MG/ML
INJECTION, SOLUTION INTRAMUSCULAR; INTRAVENOUS
Status: DISCONTINUED | OUTPATIENT
Start: 2022-03-17 | End: 2022-03-17 | Stop reason: HOSPADM

## 2022-03-17 RX ORDER — TRIAMCINOLONE ACETONIDE 1 MG/G
CREAM TOPICAL 2 TIMES DAILY
Status: DISCONTINUED | OUTPATIENT
Start: 2022-03-17 | End: 2022-03-17

## 2022-03-17 RX ORDER — MEPERIDINE HYDROCHLORIDE 25 MG/ML
12.5 INJECTION INTRAMUSCULAR; INTRAVENOUS; SUBCUTANEOUS
Status: DISCONTINUED | OUTPATIENT
Start: 2022-03-17 | End: 2022-03-17 | Stop reason: HOSPADM

## 2022-03-17 RX ORDER — ACETAMINOPHEN 500 MG
500-1000 TABLET ORAL EVERY 6 HOURS PRN
COMMUNITY

## 2022-03-17 RX ORDER — CEFAZOLIN SODIUM 1 G/3ML
INJECTION, POWDER, FOR SOLUTION INTRAMUSCULAR; INTRAVENOUS PRN
Status: DISCONTINUED | OUTPATIENT
Start: 2022-03-17 | End: 2022-03-17 | Stop reason: SURG

## 2022-03-17 RX ORDER — HALOPERIDOL 5 MG/ML
1 INJECTION INTRAMUSCULAR
Status: DISCONTINUED | OUTPATIENT
Start: 2022-03-17 | End: 2022-03-17 | Stop reason: HOSPADM

## 2022-03-17 RX ORDER — KETOROLAC TROMETHAMINE 30 MG/ML
15 INJECTION, SOLUTION INTRAMUSCULAR; INTRAVENOUS EVERY 6 HOURS
Status: DISCONTINUED | OUTPATIENT
Start: 2022-03-17 | End: 2022-03-18 | Stop reason: HOSPADM

## 2022-03-17 RX ORDER — DULOXETIN HYDROCHLORIDE 20 MG/1
20 CAPSULE, DELAYED RELEASE ORAL 2 TIMES DAILY
Status: DISCONTINUED | OUTPATIENT
Start: 2022-03-17 | End: 2022-03-18 | Stop reason: HOSPADM

## 2022-03-17 RX ORDER — ENEMA 19; 7 G/133ML; G/133ML
1 ENEMA RECTAL
Status: DISCONTINUED | OUTPATIENT
Start: 2022-03-17 | End: 2022-03-18 | Stop reason: HOSPADM

## 2022-03-17 RX ORDER — SODIUM CHLORIDE, SODIUM LACTATE, POTASSIUM CHLORIDE, CALCIUM CHLORIDE 600; 310; 30; 20 MG/100ML; MG/100ML; MG/100ML; MG/100ML
INJECTION, SOLUTION INTRAVENOUS CONTINUOUS
Status: ACTIVE | OUTPATIENT
Start: 2022-03-17 | End: 2022-03-17

## 2022-03-17 RX ORDER — HYDROMORPHONE HYDROCHLORIDE 1 MG/ML
0.1 INJECTION, SOLUTION INTRAMUSCULAR; INTRAVENOUS; SUBCUTANEOUS
Status: DISCONTINUED | OUTPATIENT
Start: 2022-03-17 | End: 2022-03-17 | Stop reason: HOSPADM

## 2022-03-17 RX ORDER — HYDROMORPHONE HYDROCHLORIDE 1 MG/ML
0.5 INJECTION, SOLUTION INTRAMUSCULAR; INTRAVENOUS; SUBCUTANEOUS
Status: DISCONTINUED | OUTPATIENT
Start: 2022-03-17 | End: 2022-03-18 | Stop reason: HOSPADM

## 2022-03-17 RX ORDER — POLYETHYLENE GLYCOL 3350 17 G/17G
1 POWDER, FOR SOLUTION ORAL 2 TIMES DAILY PRN
Status: DISCONTINUED | OUTPATIENT
Start: 2022-03-17 | End: 2022-03-18 | Stop reason: HOSPADM

## 2022-03-17 RX ORDER — DOCUSATE SODIUM 100 MG/1
100 CAPSULE, LIQUID FILLED ORAL 2 TIMES DAILY
Status: DISCONTINUED | OUTPATIENT
Start: 2022-03-17 | End: 2022-03-18 | Stop reason: HOSPADM

## 2022-03-17 RX ORDER — OMEPRAZOLE 20 MG/1
20 CAPSULE, DELAYED RELEASE ORAL DAILY
Status: DISCONTINUED | OUTPATIENT
Start: 2022-03-17 | End: 2022-03-18 | Stop reason: HOSPADM

## 2022-03-17 RX ORDER — TRANEXAMIC ACID 100 MG/ML
INJECTION, SOLUTION INTRAVENOUS PRN
Status: DISCONTINUED | OUTPATIENT
Start: 2022-03-17 | End: 2022-03-17 | Stop reason: SURG

## 2022-03-17 RX ORDER — DEXAMETHASONE SODIUM PHOSPHATE 4 MG/ML
6 INJECTION, SOLUTION INTRA-ARTICULAR; INTRALESIONAL; INTRAMUSCULAR; INTRAVENOUS; SOFT TISSUE ONCE
Status: COMPLETED | OUTPATIENT
Start: 2022-03-18 | End: 2022-03-18

## 2022-03-17 RX ORDER — CEFAZOLIN SODIUM 1 G/3ML
2 INJECTION, POWDER, FOR SOLUTION INTRAMUSCULAR; INTRAVENOUS ONCE
Status: DISCONTINUED | OUTPATIENT
Start: 2022-03-17 | End: 2022-03-17 | Stop reason: HOSPADM

## 2022-03-17 RX ORDER — BISACODYL 10 MG
10 SUPPOSITORY, RECTAL RECTAL
Status: DISCONTINUED | OUTPATIENT
Start: 2022-03-17 | End: 2022-03-18 | Stop reason: HOSPADM

## 2022-03-17 RX ORDER — HALOPERIDOL 5 MG/ML
1 INJECTION INTRAMUSCULAR EVERY 6 HOURS PRN
Status: DISCONTINUED | OUTPATIENT
Start: 2022-03-17 | End: 2022-03-18 | Stop reason: HOSPADM

## 2022-03-17 RX ORDER — ACETAMINOPHEN 500 MG
1000 TABLET ORAL EVERY 6 HOURS PRN
Status: DISCONTINUED | OUTPATIENT
Start: 2022-03-22 | End: 2022-03-18 | Stop reason: HOSPADM

## 2022-03-17 RX ORDER — BUPIVACAINE HYDROCHLORIDE AND EPINEPHRINE 2.5; 5 MG/ML; UG/ML
INJECTION, SOLUTION EPIDURAL; INFILTRATION; INTRACAUDAL; PERINEURAL
Status: DISCONTINUED | OUTPATIENT
Start: 2022-03-17 | End: 2022-03-17 | Stop reason: HOSPADM

## 2022-03-17 RX ORDER — HYDROMORPHONE HYDROCHLORIDE 1 MG/ML
0.4 INJECTION, SOLUTION INTRAMUSCULAR; INTRAVENOUS; SUBCUTANEOUS
Status: DISCONTINUED | OUTPATIENT
Start: 2022-03-17 | End: 2022-03-17 | Stop reason: HOSPADM

## 2022-03-17 RX ORDER — ONDANSETRON 2 MG/ML
4 INJECTION INTRAMUSCULAR; INTRAVENOUS
Status: DISCONTINUED | OUTPATIENT
Start: 2022-03-17 | End: 2022-03-17 | Stop reason: HOSPADM

## 2022-03-17 RX ORDER — OXYCODONE HYDROCHLORIDE 10 MG/1
10 TABLET ORAL
Status: DISCONTINUED | OUTPATIENT
Start: 2022-03-17 | End: 2022-03-18 | Stop reason: HOSPADM

## 2022-03-17 RX ORDER — ACETAMINOPHEN 500 MG
1000 TABLET ORAL EVERY 6 HOURS
Status: DISCONTINUED | OUTPATIENT
Start: 2022-03-17 | End: 2022-03-18 | Stop reason: HOSPADM

## 2022-03-17 RX ORDER — LIDOCAINE HYDROCHLORIDE 20 MG/ML
INJECTION, SOLUTION EPIDURAL; INFILTRATION; INTRACAUDAL; PERINEURAL PRN
Status: DISCONTINUED | OUTPATIENT
Start: 2022-03-17 | End: 2022-03-17 | Stop reason: SURG

## 2022-03-17 RX ORDER — OXYCODONE HCL 5 MG/5 ML
5 SOLUTION, ORAL ORAL
Status: COMPLETED | OUTPATIENT
Start: 2022-03-17 | End: 2022-03-17

## 2022-03-17 RX ORDER — DIPHENHYDRAMINE HYDROCHLORIDE 50 MG/ML
25 INJECTION INTRAMUSCULAR; INTRAVENOUS EVERY 6 HOURS PRN
Status: DISCONTINUED | OUTPATIENT
Start: 2022-03-17 | End: 2022-03-18 | Stop reason: HOSPADM

## 2022-03-17 RX ORDER — DEXAMETHASONE SODIUM PHOSPHATE 4 MG/ML
4 INJECTION, SOLUTION INTRA-ARTICULAR; INTRALESIONAL; INTRAMUSCULAR; INTRAVENOUS; SOFT TISSUE
Status: DISCONTINUED | OUTPATIENT
Start: 2022-03-17 | End: 2022-03-18 | Stop reason: HOSPADM

## 2022-03-17 RX ORDER — AMOXICILLIN 250 MG
1 CAPSULE ORAL NIGHTLY
Status: DISCONTINUED | OUTPATIENT
Start: 2022-03-17 | End: 2022-03-18 | Stop reason: HOSPADM

## 2022-03-17 RX ORDER — DIPHENHYDRAMINE HYDROCHLORIDE 50 MG/ML
12.5 INJECTION INTRAMUSCULAR; INTRAVENOUS
Status: DISCONTINUED | OUTPATIENT
Start: 2022-03-17 | End: 2022-03-17 | Stop reason: HOSPADM

## 2022-03-17 RX ORDER — HYDROMORPHONE HYDROCHLORIDE 1 MG/ML
0.2 INJECTION, SOLUTION INTRAMUSCULAR; INTRAVENOUS; SUBCUTANEOUS
Status: DISCONTINUED | OUTPATIENT
Start: 2022-03-17 | End: 2022-03-17 | Stop reason: HOSPADM

## 2022-03-17 RX ORDER — CEFAZOLIN SODIUM IN 0.9 % NACL 2 G/100 ML
2 PLASTIC BAG, INJECTION (ML) INTRAVENOUS ONCE
Status: COMPLETED | OUTPATIENT
Start: 2022-03-17 | End: 2022-03-17

## 2022-03-17 RX ORDER — IBUPROFEN 400 MG/1
800 TABLET ORAL 3 TIMES DAILY PRN
Status: DISCONTINUED | OUTPATIENT
Start: 2022-03-20 | End: 2022-03-18 | Stop reason: HOSPADM

## 2022-03-17 RX ORDER — KETOROLAC TROMETHAMINE 30 MG/ML
INJECTION, SOLUTION INTRAMUSCULAR; INTRAVENOUS PRN
Status: DISCONTINUED | OUTPATIENT
Start: 2022-03-17 | End: 2022-03-17 | Stop reason: SURG

## 2022-03-17 RX ORDER — SODIUM CHLORIDE 9 MG/ML
INJECTION, SOLUTION INTRAMUSCULAR; INTRAVENOUS; SUBCUTANEOUS
Status: DISCONTINUED | OUTPATIENT
Start: 2022-03-17 | End: 2022-03-17 | Stop reason: HOSPADM

## 2022-03-17 RX ORDER — ONDANSETRON 2 MG/ML
4 INJECTION INTRAMUSCULAR; INTRAVENOUS EVERY 4 HOURS PRN
Status: DISCONTINUED | OUTPATIENT
Start: 2022-03-17 | End: 2022-03-18 | Stop reason: HOSPADM

## 2022-03-17 RX ADMIN — ONDANSETRON 4 MG: 2 INJECTION INTRAMUSCULAR; INTRAVENOUS at 07:33

## 2022-03-17 RX ADMIN — DOCUSATE SODIUM 100 MG: 100 CAPSULE, LIQUID FILLED ORAL at 18:33

## 2022-03-17 RX ADMIN — ASPIRIN 81 MG: 81 TABLET, COATED ORAL at 23:05

## 2022-03-17 RX ADMIN — CEFAZOLIN 2 G: 1 INJECTION, POWDER, FOR SOLUTION INTRAVENOUS at 11:56

## 2022-03-17 RX ADMIN — FENTANYL CITRATE 50 MCG: 50 INJECTION, SOLUTION INTRAMUSCULAR; INTRAVENOUS at 09:46

## 2022-03-17 RX ADMIN — TRANEXAMIC ACID 1000 MG: 100 INJECTION, SOLUTION INTRAVENOUS at 10:10

## 2022-03-17 RX ADMIN — ACETAMINOPHEN 1000 MG: 500 TABLET, FILM COATED ORAL at 11:57

## 2022-03-17 RX ADMIN — HYDROMORPHONE HYDROCHLORIDE 1 MG: 2 INJECTION INTRAMUSCULAR; INTRAVENOUS; SUBCUTANEOUS at 07:56

## 2022-03-17 RX ADMIN — KETOROLAC TROMETHAMINE 30 MG: 30 INJECTION, SOLUTION INTRAMUSCULAR at 08:17

## 2022-03-17 RX ADMIN — DEXAMETHASONE SODIUM PHOSPHATE 8 MG: 4 INJECTION, SOLUTION INTRAMUSCULAR; INTRAVENOUS at 07:35

## 2022-03-17 RX ADMIN — ACETAMINOPHEN 1000 MG: 500 TABLET, FILM COATED ORAL at 18:33

## 2022-03-17 RX ADMIN — KETOROLAC TROMETHAMINE 15 MG: 30 INJECTION, SOLUTION INTRAMUSCULAR at 18:34

## 2022-03-17 RX ADMIN — ACETAMINOPHEN 1000 MG: 500 TABLET, FILM COATED ORAL at 23:05

## 2022-03-17 RX ADMIN — CEFAZOLIN 2 G: 330 INJECTION, POWDER, FOR SOLUTION INTRAMUSCULAR; INTRAVENOUS at 07:28

## 2022-03-17 RX ADMIN — LIDOCAINE HYDROCHLORIDE 100 MG: 20 INJECTION, SOLUTION EPIDURAL; INFILTRATION; INTRACAUDAL; PERINEURAL at 07:33

## 2022-03-17 RX ADMIN — FENTANYL CITRATE 50 MCG: 50 INJECTION, SOLUTION INTRAMUSCULAR; INTRAVENOUS at 10:00

## 2022-03-17 RX ADMIN — OXYBUTYNIN CHLORIDE 5 MG: 5 TABLET, EXTENDED RELEASE ORAL at 18:33

## 2022-03-17 RX ADMIN — HYDROMORPHONE HYDROCHLORIDE 1 MG: 2 INJECTION INTRAMUSCULAR; INTRAVENOUS; SUBCUTANEOUS at 07:28

## 2022-03-17 RX ADMIN — SUGAMMADEX 200 MG: 100 INJECTION, SOLUTION INTRAVENOUS at 09:06

## 2022-03-17 RX ADMIN — DOCUSATE SODIUM 100 MG: 100 CAPSULE, LIQUID FILLED ORAL at 11:56

## 2022-03-17 RX ADMIN — TRANEXAMIC ACID 1000 MG: 100 INJECTION, SOLUTION INTRAVENOUS at 07:35

## 2022-03-17 RX ADMIN — DULOXETINE HYDROCHLORIDE 20 MG: 20 CAPSULE, DELAYED RELEASE ORAL at 18:33

## 2022-03-17 RX ADMIN — PROPOFOL 150 MG: 10 INJECTION, EMULSION INTRAVENOUS at 07:33

## 2022-03-17 RX ADMIN — ONDANSETRON 4 MG: 2 INJECTION INTRAMUSCULAR; INTRAVENOUS at 11:57

## 2022-03-17 RX ADMIN — SENNOSIDES AND DOCUSATE SODIUM 1 TABLET: 50; 8.6 TABLET ORAL at 23:05

## 2022-03-17 RX ADMIN — OXYCODONE HYDROCHLORIDE 10 MG: 5 SOLUTION ORAL at 09:43

## 2022-03-17 RX ADMIN — ROCURONIUM BROMIDE 80 MG: 10 INJECTION, SOLUTION INTRAVENOUS at 07:35

## 2022-03-17 RX ADMIN — OMEPRAZOLE 20 MG: 20 CAPSULE, DELAYED RELEASE ORAL at 11:56

## 2022-03-17 RX ADMIN — KETOROLAC TROMETHAMINE 15 MG: 30 INJECTION, SOLUTION INTRAMUSCULAR at 23:05

## 2022-03-17 RX ADMIN — KETOROLAC TROMETHAMINE 15 MG: 30 INJECTION, SOLUTION INTRAMUSCULAR at 13:17

## 2022-03-17 RX ADMIN — SODIUM CHLORIDE, POTASSIUM CHLORIDE, SODIUM LACTATE AND CALCIUM CHLORIDE: 600; 310; 30; 20 INJECTION, SOLUTION INTRAVENOUS at 06:58

## 2022-03-17 RX ADMIN — DULOXETINE HYDROCHLORIDE 20 MG: 20 CAPSULE, DELAYED RELEASE ORAL at 11:56

## 2022-03-17 ASSESSMENT — COGNITIVE AND FUNCTIONAL STATUS - GENERAL
WALKING IN HOSPITAL ROOM: A LITTLE
MOVING TO AND FROM BED TO CHAIR: A LITTLE
SUGGESTED CMS G CODE MODIFIER MOBILITY: CK
TURNING FROM BACK TO SIDE WHILE IN FLAT BAD: A LITTLE
STANDING UP FROM CHAIR USING ARMS: A LITTLE
DAILY ACTIVITIY SCORE: 18
DRESSING REGULAR LOWER BODY CLOTHING: A LITTLE
CLIMB 3 TO 5 STEPS WITH RAILING: A LITTLE
HELP NEEDED FOR BATHING: A LITTLE
CLIMB 3 TO 5 STEPS WITH RAILING: A LITTLE
MOVING FROM LYING ON BACK TO SITTING ON SIDE OF FLAT BED: A LITTLE
SUGGESTED CMS G CODE MODIFIER DAILY ACTIVITY: CK
MOBILITY SCORE: 18
SUGGESTED CMS G CODE MODIFIER MOBILITY: CI
MOBILITY SCORE: 23
EATING MEALS: A LITTLE
DRESSING REGULAR UPPER BODY CLOTHING: A LITTLE
PERSONAL GROOMING: A LITTLE
TOILETING: A LITTLE

## 2022-03-17 ASSESSMENT — LIFESTYLE VARIABLES
TOTAL SCORE: 0
HOW MANY TIMES IN THE PAST YEAR HAVE YOU HAD 5 OR MORE DRINKS IN A DAY: 0
ALCOHOL_USE: YES
EVER HAD A DRINK FIRST THING IN THE MORNING TO STEADY YOUR NERVES TO GET RID OF A HANGOVER: NO
HAVE PEOPLE ANNOYED YOU BY CRITICIZING YOUR DRINKING: NO
AVERAGE NUMBER OF DAYS PER WEEK YOU HAVE A DRINK CONTAINING ALCOHOL: 2
CONSUMPTION TOTAL: NEGATIVE
ON A TYPICAL DAY WHEN YOU DRINK ALCOHOL HOW MANY DRINKS DO YOU HAVE: 1
EVER FELT BAD OR GUILTY ABOUT YOUR DRINKING: NO
HAVE YOU EVER FELT YOU SHOULD CUT DOWN ON YOUR DRINKING: NO

## 2022-03-17 ASSESSMENT — GAIT ASSESSMENTS
DEVIATION: STEP TO
GAIT LEVEL OF ASSIST: SUPERVISED
ASSISTIVE DEVICE: FRONT WHEEL WALKER
DISTANCE (FEET): 150

## 2022-03-17 ASSESSMENT — FIBROSIS 4 INDEX: FIB4 SCORE: 1.26

## 2022-03-17 ASSESSMENT — PAIN DESCRIPTION - PAIN TYPE
TYPE: SURGICAL PAIN
TYPE: ACUTE PAIN;CHRONIC PAIN
TYPE: SURGICAL PAIN

## 2022-03-17 ASSESSMENT — PATIENT HEALTH QUESTIONNAIRE - PHQ9
SUM OF ALL RESPONSES TO PHQ9 QUESTIONS 1 AND 2: 0
2. FEELING DOWN, DEPRESSED, IRRITABLE, OR HOPELESS: NOT AT ALL
1. LITTLE INTEREST OR PLEASURE IN DOING THINGS: NOT AT ALL

## 2022-03-17 ASSESSMENT — PAIN SCALES - GENERAL: PAIN_LEVEL: 2

## 2022-03-17 NOTE — ANESTHESIA POSTPROCEDURE EVALUATION
Patient: Garima Banda    Procedure Summary     Date: 03/17/22 Room / Location:  OR  / SURGERY [facility]    Anesthesia Start: 0727 Anesthesia Stop: 0915    Procedure: ARTHROPLASTY, HIP, TOTAL (Left Hip) Diagnosis:       Primary osteoarthritis of left hip      (Primary osteoarthritis of left hip)    Surgeons: Chavo Otto M.D. Responsible Provider: Onur Zayas M.D.    Anesthesia Type: general ASA Status: 2          Final Anesthesia Type: general  Last vitals  BP   Blood Pressure : 105/57    Temp   36.8 °C (98.2 °F)    Pulse   70   Resp   16    SpO2   92 %      Anesthesia Post Evaluation    Patient location during evaluation: PACU  Patient participation: complete - patient participated  Level of consciousness: awake and alert  Pain score: 2    Airway patency: patent  Anesthetic complications: no  Cardiovascular status: hemodynamically stable  Respiratory status: acceptable  Hydration status: euvolemic    PONV: none          No complications documented.     Nurse Pain Score: 7 (NPRS)

## 2022-03-17 NOTE — PROGRESS NOTES
Received report from PACU nurse. Pt on floor. Assumed care of pt. Pt awake and alert. VSS. Assessment completed, CMS intact. Dressing in place to left hip, CDI. Polar ice in place to surgical site. Pt states pain 0/10. Denies any numbness or tingling to extremities at this time. Updated pt on plan of care for the day. Call light within reach, bed locked and in lowest position. Fall precautions in place. No other needs at this time.

## 2022-03-17 NOTE — ANESTHESIA PROCEDURE NOTES
Airway    Date/Time: 3/17/2022 7:34 AM  Performed by: Onur Zayas M.D.  Authorized by: Onur Zayas M.D.     Location:  OR  Urgency:  Elective  Indications for Airway Management:  Anesthesia      Spontaneous Ventilation: absent    Sedation Level:  Deep  Preoxygenated: Yes    Patient Position:  Sniffing  Final Airway Type:  Endotracheal airway  Final Endotracheal Airway:  ETT  Cuffed: Yes    Technique Used for Successful ETT Placement:  Direct laryngoscopy    Insertion Site:  Oral  Blade Type:  Radhika  Laryngoscope Blade/Videolaryngoscope Blade Size:  3  ETT Size (mm):  7.0  Measured from:  Teeth  ETT to Teeth (cm):  20  Placement Verified by: auscultation and capnometry    Cormack-Lehane Classification:  Grade I - full view of glottis  Number of Attempts at Approach:  1

## 2022-03-17 NOTE — OR NURSING
0620: Brought patient back to pre-op and assumed care.  0705: Patient allergies and NPO status verified, home medication reconciliation completed and belongings secured. Patient verbalizes understanding of pain scale, expected course of stay and plan of care. Surgical site verified with patient. IV access established. Sequentials placed on legs.

## 2022-03-17 NOTE — OP REPORT
Operative Report    Date: 3/17/2022    Surgeon: Chavo Otto M.D.    Assistant: Jese Calderón M.D.    Anesthesiologist: Onur Zayas M.D.     Pre-operative Diagnosis: Degenerative Arthritisarthritis left hip    Post-operative Diagnosis: Degenerative Arthritis    Procedure: Left  total hip arthroplasty    EBL:100    Implants: Press-fit Ruben size 50 Trident 2 acetabulum and a Ruben size 2 standard offset Accolade 2 stem with a 36 mm +5 ceramic head    Indications: Patient is an 71 y.o.-year-old female with longstanding pain in the left hip secondary toDegenerative Arthritis arthritis.  A variety of nonsurgical treatments have been tried that include activity modification, over-the-counter medications, and therapy for strengthening and stretching the muscles.  The pain is now to the point of limiting the activities of daily living, interfering with enjoyable activities, and not responding to nonsurgical methods of treatment.  On physical examination the hip has painful and limited range of motion.   X-ray views of the hip show advanced degenerative arthritis with subchondral sclerosis, peripheral osteophytes, and joint space narrowing.  The patient is therefore a candidate for a left total hip replacement.      Findings: Severe osteoarthritic change    Procedure in detail: The patient was brought to the operating room and anesthesia was administered.  Antibiotics and tranexamic acid given IV.  Leg lengths were measured in the supine position and the operative leg was approximately 2mmshort.  The patient was then placed in the appropriate lateral decubitus position on a well-padded pegboard..  An axillary roll was placed, and patient secured with well-padded pegs.  Leg lengths were then measured in this position and a timeout was called.    A standard anterolateral incision was made starting at the front of the trochanter and extending up to just distal and lateral to the anterior superior iliac spine.   Dissection was carried sharply through skin and subcutaneous tissues and bleeders stopped with electrocautery.  The fascia was opened just in front of the greater trochanter and then proximally along the anterior margin of the gluteus medius muscle.  The interval between the gluteus medius and tensor fascia was developed.  Vessels were coagulated and the superior gluteal nerve was mobilized proximally and protected.    The capsule was exposed, cleared and a capsulotomy was made.  A subcapital femoral cut was made  in the plane of the acetabular opening.  The proximal femur was then delivered into the wound with extension and external rotation.  A secondary cut at the base of the femoral neck was made using appropriate cutting guide.  The femoral neck segment was removed.    Release of the capsule and obturator internus as needed was made to gain adequate exposure to the proximal femur.  The standard instrumentation was then used to broach the femoral canal.  Care was taken to ensure we were within the intramedullary canal.  We were able to broach up to a size 2 femoral stem which had excellent axial and rotational stability.    The acetabulum was then exposed and the femoral head was removed.  The labrum, ligamentum teres, and capsule were excised enough for visualization.  We then reamed down to the inner table, and then outward until we had good hemispherical bleeding bone with a size 50 reamer.  The size50 cup was then placed with excellent press-fit.  2 dome screws were placed with good purchase.  Osteophytes were removed as needed to avoid impingement. The liner was then locked into position.    Trialing was then carried out with the size 2femoral component and the standardoffset neck.  The  +5head had the best length and stability characteristics.  Care was taken to ensure the most stable hip that most closely equalized the patient's leg lengths.    We then press-fit the real size 2femoral stem with  standardoffset neck   After final trialing we selected the +5ceramic head to achieve our goals of stability followed by length.  Final reduction was done after all debris had been cleared from the hip joint.  Final stability check was done.    The wound was then soaked with Betadine in patients without Betadine allergy.  We then flushed the wound thoroughly with saline.  The capsule was closed with interrupted #1 Vicryl.  The fascia was then closed with interrupted #1 Vicryl and the hip joint region injected with local anesthetic.  The skin was closed with layers of 2-0 pop-off Vicryl and a running 3-0 Monocryl.  A silver-impregnated  dressing was placed.  Patient was stable during the procedure and at the time of this dictation.      Dispo: PACU - hemodynamically stable.

## 2022-03-17 NOTE — H&P
"Surgery Orthopedic History & Physical Note    Date  3/17/2022    Primary Care Physician  Myranda Page M.D.    CC  Pre-Op Diagnosis Codes:     * Primary osteoarthritis of left hip [M16.12]    HPI  This is a 71 y.o. female who presented with severe pain secondary to left hip DJD.  She is coming in forleft total hip replacement.  She has done well with the right hip done last year.    Past Medical History:   Diagnosis Date   • Arrhythmia     \"Sometimes I feel a flutter\" states had an echo done in [place] 20yrs. ago and didn't hear back from the cardiologist. Also had a nuclear stress test and states \"everything was fine\".   • Arthritis     osteo arthritis   • Back pain    • Back pain    • Chickenpox    • Closed fracture of distal end of right radius 3/25/2015   • Cough    • Depression    • Elevated liver enzymes     hx of   • Frequent headaches    • Frequent urination    • Hay fever    • High cholesterol    • Hyperlipidemia    • Insomnia    • Low blood pressure     \"no need for medications according to doctors\"   • Morning headache    • Nasal drainage    • Obesity    • Pain     back/hips   • Painful joint    • Rhinitis    • Ringing in ears    • Sleep apnea     suspected, no sleep study, CLAIRE score of 4   • Toothache 03/02/2022    Dies currently   • Urinary incontinence    • Wears glasses        Past Surgical History:   Procedure Laterality Date   • PB TOTAL HIP ARTHROPLASTY Right 10/20/2021    Procedure: ARTHROPLASTY, HIP, TOTAL;  Surgeon: Chavo Otto M.D.;  Location: SURGERY [facility];  Service: Orthopedics   • CARPAL TUNNEL RELEASE     • HIP REPLACEMENT, TOTAL     • OTHER      trigger finger R; R radial fracture repair no hardare (pins taken out), CTR b/l        No current facility-administered medications for this encounter.       Social History     Socioeconomic History   • Marital status: Single     Spouse name: Not on file   • Number of children: Not on file   • Years of education: Not on file   • " Highest education level: Not on file   Occupational History   • Not on file   Tobacco Use   • Smoking status: Never Smoker   • Smokeless tobacco: Never Used   • Tobacco comment: second hand smoke casinos x 28 years   Vaping Use   • Vaping Use: Never used   Substance and Sexual Activity   • Alcohol use: Yes     Alcohol/week: 0.6 oz     Types: 1 Standard drinks or equivalent per week     Comment: zero to two drinks per week   • Drug use: Not Currently     Types: Marijuana, Inhaled     Comment: I smoke pot maybe three to four times a year   • Sexual activity: Not on file   Other Topics Concern   • Not on file   Social History Narrative   • Not on file     Social Determinants of Health     Financial Resource Strain: Not on file   Food Insecurity: Not on file   Transportation Needs: Not on file   Physical Activity: Not on file   Stress: Not on file   Social Connections: Not on file   Intimate Partner Violence: Not on file   Housing Stability: Not on file       Family History   Problem Relation Age of Onset   • Cancer Mother         breast. lung   • Breast Cancer Mother    • Lung Cancer Mother    • Cancer Father         colon   • Colon Cancer Father    • Lung Cancer Father    • Diabetes Brother    • Prostate cancer Brother        Allergies  Patient has no known allergies.    Review of Systems  Negative    Physical Exam  Constitutional:       General: She is not in acute distress.     Appearance: Normal appearance. She is obese.   Eyes:      Extraocular Movements: Extraocular movements intact.   Cardiovascular:      Rate and Rhythm: Normal rate and regular rhythm.      Pulses: Normal pulses.   Pulmonary:      Effort: Pulmonary effort is normal.   Musculoskeletal:      Comments: Very stiff left hip with pain on attempts at flexion and rotation.   Neurological:      General: No focal deficit present.      Mental Status: She is alert.   Psychiatric:         Mood and Affect: Mood normal.         Vital Signs                           Labs:                    Radiology:  No orders to display     Bone on bone, advance DJD left hip    Assessment/Plan:  Pre-Op Diagnosis Codes:     * Primary osteoarthritis of left hip [M16.12]  Procedure(s):  ARTHROPLASTY, HIP, TOTAL Patient understands risks and goals of surgery.

## 2022-03-17 NOTE — ANESTHESIA TIME REPORT
Anesthesia Start and Stop Event Times     Date Time Event    3/17/2022 0726 Ready for Procedure     0727 Anesthesia Start     0915 Anesthesia Stop        Responsible Staff  03/17/22    Name Role Begin End    Onur Zayas M.D. Anesth 0727 0915        Preop Diagnosis (Free Text):  Pre-op Diagnosis     Primary osteoarthritis of left hip [M16.12]        Preop Diagnosis (Codes):  Diagnosis Information     Diagnosis Code(s): Primary osteoarthritis of left hip [M16.12]        Premium Reason  Non-Premium    Comments:

## 2022-03-17 NOTE — OR NURSING
0957-report from Kleber GREEN. Patient is awake, on NC at 3L     1000-pt reports pain level remains at 7/10. Medicated per orders     1010-pt reports pain level is a 5/10 and tolerable. Declines any additional medication at this time     1012-called and updated patients sister in law Rachele at this time     1019-report given to Roxanne GREEN    1020-pt reports pain level down to 4/10     1023-report to Kleber GREEN

## 2022-03-17 NOTE — THERAPY
Physical Therapy   Initial Evaluation     Patient Name: Garima Banda  Age:  71 y.o., Sex:  female  Medical Record #: [id number]  Today's Date: 3/17/2022          Assessment  Patient is 71 y.o. female with a diagnosis of L THR ant Pt lives at home with sister in law and is active.Acute PT needed to improve ambulation and stairs,teach HEP.    Plan    Recommend Physical Therapy daily until therapy goals are met for the following treatments:     03/17/22 1500   Total Time Spent   Total Time Spent (Mins) 30   Charge Group   PT Evaluation PT Evaluation Mod   Initial Contact Note    Initial Contact Note Order Received and Verified, Physical Therapy Evaluation in Progress with Full Report to Follow.   Pain 0 - 10 Group   Therapist Pain Assessment 2   Prior Living Situation   Prior Services None   Housing / Facility 1 Story House   Steps Into Home 1   Steps In Home 0   Equipment Owned Front-Wheel Walker   Lives with - Patient's Self Care Capacity   (sister in law)   Comments sister in law will help   Prior Level of Functional Mobility   Bed Mobility Independent   Transfer Status Independent   Ambulation Independent   Distance Ambulation (Feet)   (community amb)   Assistive Devices Used None   Stairs Independent   History of Falls   History of Falls No   Cognition    Cognition / Consciousness WDL   Level of Consciousness Alert   Passive ROM Lower Body   Passive ROM Lower Body X   Active ROM Lower Body    Active ROM Lower Body  X   Strength Lower Body   Lower Body Strength  X   Coordination Lower Body    Coordination Lower Body  WDL   Balance Assessment   Sitting Balance (Static) Good   Sitting Balance (Dynamic) Good   Standing Balance (Static) Fair +   Standing Balance (Dynamic) Fair +   Weight Shift Sitting Good   Weight Shift Standing Good   Gait Analysis   Gait Level Of Assist Supervised   Assistive Device Front Wheel Walker   Distance (Feet) 150   # of Times Distance was Traveled 1   Deviation Step To   # of Stairs  Climbed 0   Weight Bearing Status wbat L   Bed Mobility    Supine to Sit Modified Independent   Sit to Supine Modified Independent   Scooting Modified Independent   Functional Mobility   Sit to Stand Supervised   Bed, Chair, Wheelchair Transfer Supervised   Transfer Method Stand Step   How much difficulty does the patient currently have...   Turning over in bed (including adjusting bedclothes, sheets and blankets)? 4   Sitting down on and standing up from a chair with arms (e.g., wheelchair, bedside commode, etc.) 4   Moving from lying on back to sitting on the side of the bed? 4   How much help from another person does the patient currently need...   Moving to and from a bed to a chair (including a wheelchair)? 4   Need to walk in a hospital room? 4   Climbing 3-5 steps with a railing? 3   6 clicks Mobility Score 23   Activity Tolerance   Sitting Edge of Bed 10   Standing 10   Patient / Family Goals    Patient / Family Goal #1 Home   Short Term Goals    Short Term Goal # 1 I with HEP   Short Term Goal # 2 Mod I amb x 200 feet in 2 V   Short Term Goal # 3 S x 1 stair in 2 V   Problem List    Problems Impaired Ambulation;Functional ROM Deficit;Functional Strength Deficit   Anticipated Discharge Equipment and Recommendations   DC Equipment Recommendations None   Discharge Recommendations Recommend outpatient physical therapy services to address higher level deficits   Interdisciplinary Plan of Care Collaboration   IDT Collaboration with  Nursing   Session Information   Date / Session Number  3/17-1 1/7 3/23   Gait Training, Neuro Re-Education / Balance, Stair Training, Therapeutic Activities, and Therapeutic Exercises    DC Equipment Recommendations: (P) None  Discharge Recommendations: (P) Recommend outpatient physical therapy services to address higher level deficits

## 2022-03-17 NOTE — LETTER
December 7, 2021    Patient Name: Garima Banda  Surgeon Name: Chavo Otto M.D.  Surgery Facility: [facility] ([address] [facility])  Surgery Date: 3/16/2022    The time of your surgery is not final and may change up to and until the day of your surgery. You will be contacted 24-48 hours prior to your surgery date with your check-in and surgery time.    If you will not be at one of the below numbers please call/text the surgery scheduler at [phone]  Preferred Phone: [phone]    BEFORE YOUR SURGERY  Pre Registration and/or Lab Work must be done within and no earlier than 28 days prior to your surgery date. Please call [facility] at [phone] for an appointment as soon as possible.     Pre op Appointment:   Date: 3/09/2022   Time: 1:20pm   Provider: Chavo Otto M.D.   Location: [address] [place] [address]  Instructions: Bring a list of all medications you are taking including the dosing and frequency.    Please refrain from smoking any substance after midnight prior to surgery. Smoking may interfere with the anesthetic and frequently produces nausea during the recovery period.    Continue taking all lifesaving medications. Including the morning of your surgery with small sip of water.    Please read the MEDICATION INSTRUCTIONS below completely.    DAY OF YOUR SURGERY  Nothing to eat or drink after midnight     Please arrive at the hospital/surgery center at the check-in time provided.     An adult will need to bring you and take you home after your surgery.     AFTER YOUR SURGERY  Post op Appointment:   Date: 3/23/2022   Time: 1:20pm   With: Chavo Otto M.D.   Location: [address] [place] [id number]    - No dental work for 3-6 months after your surgery.    TIME OFF WORK  FMLA or Disability forms can be faxed directly to: [phone] or you may drop them off at [address] [place] [phone]. Our office  charges a $35.00 fee per form. Forms will be completed within 10 business days of drop off and payment received. For the status of your forms you may contact our disability office directly [phone].    MEDICATION INSTRUCTIONS  The following medications should be stopped a minimum of 10 days prior to surgery:  All over the counter, Supplements & Herbal medications    Anorectics: Phentermine (Adipex-P, Lomaira and Suprenza), Phentermine-topiramate (Qsymia), Bupropion-naltrexone (Contrave)    Opiod Partial Agonists/Opioid Antagonists: Buprenorphine (Subocone, Belbuca, Butrans, Probuphine Implant, Sublocade), Naltrexone (ReVia, Vivitrol), Naloxone    Amphetamines: Dextroamphetamine/Amphetamine (Adderall, Mydayis), Methylphenidate Hydrochloride (Concerta, Metadate, Methylin, Ritalin)    The following medications should be stopped 5 days prior to surgery:  Blood Thinners: Any Aspirin, Aspirin products, anti-inflammatories such as ibuprofen and any blood thinners such as Coumadin and Plavix. Please consult your prescribing physician if you are on life saving blood thinners, in regards to when to stop medications prior to surgery.     The following medications should be stopped a minimum of 3 days prior to surgery:  PDE-5 inhibitors: Sildenafil (Viagra), Tadalafil (Cialis), Vardenafil (Levitra), Avanafil (Stendra)    MAO Inhibitors: Rasagiline (Azilect), Selegiline (Eldepryl, Emsam, Selapar), Isocarboxazid (Marplan), Phenelzine (Nardil)

## 2022-03-17 NOTE — ANESTHESIA PREPROCEDURE EVALUATION
Case: [id number] Date/Time: 03/17/22 0730    Procedure: ARTHROPLASTY, HIP, TOTAL (Left )    Diagnosis: Primary osteoarthritis of left hip [M16.12]    Pre-op diagnosis: Primary osteoarthritis of left hip [M16.12]    Location: [place] [address] / SURGERY [facility]    Surgeons: Chavo Otto M.D.          Relevant Problems   CARDIAC   (positive) Peripheral venous insufficiency       Physical Exam    Airway   Mallampati: II  TM distance: >3 FB  Neck ROM: full       Cardiovascular - normal exam  Rhythm: regular  Rate: normal  (-) murmur     Dental - normal exam           Pulmonary - normal exam  Breath sounds clear to auscultation     Abdominal    Neurological - normal exam                 Anesthesia Plan    ASA 2       Plan - general               Induction: intravenous    Postoperative Plan: Postoperative administration of opioids is intended.    Pertinent diagnostic labs and testing reviewed    Informed Consent:    Anesthetic plan and risks discussed with patient.    Use of blood products discussed with: patient whom consented to blood products.

## 2022-03-17 NOTE — CARE PLAN
The patient is Stable - Low risk of patient condition declining or worsening         Progress made toward(s) clinical / shift goals:    Problem: Pain - Standard  Goal: Alleviation of pain or a reduction in pain to the patient’s comfort goal  Outcome: Progressing     Problem: Knowledge Deficit - Standard  Goal: Patient and family/care givers will demonstrate understanding of plan of care, disease process/condition, diagnostic tests and medications  Outcome: Progressing     Problem: Post-Operative Hip Replacement  Goal: Patient will demonstrate understanding of post-surgical hip precautions, weight bearing restrictions and DME usage during hospital stay and post discharge  Outcome: Progressing  Goal: Patient's neurovascular status will be maintained or improve  Outcome: Progressing  Goal: Early mobilization post surgery  Outcome: Progressing  Goal: Proper fit of orthotic device will be assessed and maintained  Outcome: Progressing     Problem: Pain - Post Surgery  Goal: Alleviation or reduction of pain post surgery  Outcome: Progressing     Problem: Respiratory/Oxygenation Function Post-Surgical  Goal: Patient will achieve/maintain normal respiratory rate/effort  Outcome: Progressing     Problem: Bowel Elimination  Goal: Establish and maintain regular bowel function  Outcome: Progressing       Patient is not progressing towards the following goals:

## 2022-03-17 NOTE — OR NURSING
0921: To PACU post left total hip arthroplasty. Pt is extubated, breathing is spontaneous and unlabored. Strong DP pulse observed on operative extremity. Pain is 2/10.    0942: Pt states pain is 6-7/10, see MAR.    0957:  Report given to CELE Calles RN

## 2022-03-17 NOTE — PROGRESS NOTES
4 Eyes Skin Assessment Completed by NORMA Weber and NORMA Lauren.    Head WDL  Ears WDL  Nose WDL  Mouth WDL  Neck WDL  Breast/Chest WDL  Shoulder Blades WDL  Spine WDL  (R) Arm/Elbow/Hand WDL  (L) Arm/Elbow/Hand WDL  Abdomen WDL  Groin WDL  Scrotum/Coccyx/Buttocks WDL  (R) Leg WDL  (L) Leg Incision  (R) Heel/Foot/Toe WDL  (L) Heel/Foot/Toe WDL          Devices In Places Blood Pressure Cuff and SCD's      Interventions In Place Pressure Redistribution Mattress    Possible Skin Injury No    Pictures Uploaded Into Epic N/A  Wound Consult Placed N/A  RN Wound Prevention Protocol Ordered No

## 2022-03-18 VITALS
HEIGHT: 64 IN | BODY MASS INDEX: 31.16 KG/M2 | SYSTOLIC BLOOD PRESSURE: 97 MMHG | TEMPERATURE: 97.8 F | RESPIRATION RATE: 16 BRPM | DIASTOLIC BLOOD PRESSURE: 49 MMHG | HEART RATE: 73 BPM | OXYGEN SATURATION: 93 % | WEIGHT: 182.54 LBS

## 2022-03-18 PROCEDURE — 97165 OT EVAL LOW COMPLEX 30 MIN: CPT

## 2022-03-18 PROCEDURE — 96375 TX/PRO/DX INJ NEW DRUG ADDON: CPT

## 2022-03-18 PROCEDURE — 97530 THERAPEUTIC ACTIVITIES: CPT

## 2022-03-18 PROCEDURE — 700111 HCHG RX REV CODE 636 W/ 250 OVERRIDE (IP): Performed by: ORTHOPAEDIC SURGERY

## 2022-03-18 PROCEDURE — A9270 NON-COVERED ITEM OR SERVICE: HCPCS | Performed by: ORTHOPAEDIC SURGERY

## 2022-03-18 PROCEDURE — 97535 SELF CARE MNGMENT TRAINING: CPT

## 2022-03-18 PROCEDURE — G0378 HOSPITAL OBSERVATION PER HR: HCPCS

## 2022-03-18 PROCEDURE — 700102 HCHG RX REV CODE 250 W/ 637 OVERRIDE(OP): Performed by: ORTHOPAEDIC SURGERY

## 2022-03-18 PROCEDURE — 97116 GAIT TRAINING THERAPY: CPT

## 2022-03-18 PROCEDURE — 96376 TX/PRO/DX INJ SAME DRUG ADON: CPT

## 2022-03-18 RX ADMIN — KETOROLAC TROMETHAMINE 15 MG: 30 INJECTION, SOLUTION INTRAMUSCULAR at 11:45

## 2022-03-18 RX ADMIN — DEXAMETHASONE SODIUM PHOSPHATE 6 MG: 4 INJECTION, SOLUTION INTRA-ARTICULAR; INTRALESIONAL; INTRAMUSCULAR; INTRAVENOUS; SOFT TISSUE at 05:12

## 2022-03-18 RX ADMIN — DULOXETINE HYDROCHLORIDE 20 MG: 20 CAPSULE, DELAYED RELEASE ORAL at 05:10

## 2022-03-18 RX ADMIN — OMEPRAZOLE 20 MG: 20 CAPSULE, DELAYED RELEASE ORAL at 05:10

## 2022-03-18 RX ADMIN — ACETAMINOPHEN 1000 MG: 500 TABLET, FILM COATED ORAL at 11:45

## 2022-03-18 RX ADMIN — ACETAMINOPHEN 1000 MG: 500 TABLET, FILM COATED ORAL at 05:10

## 2022-03-18 RX ADMIN — ASPIRIN 81 MG: 81 TABLET, COATED ORAL at 05:10

## 2022-03-18 RX ADMIN — KETOROLAC TROMETHAMINE 15 MG: 30 INJECTION, SOLUTION INTRAMUSCULAR at 05:11

## 2022-03-18 RX ADMIN — DOCUSATE SODIUM 100 MG: 100 CAPSULE, LIQUID FILLED ORAL at 05:10

## 2022-03-18 ASSESSMENT — GAIT ASSESSMENTS
DEVIATION: STEP TO
ASSISTIVE DEVICE: FRONT WHEEL WALKER
DISTANCE (FEET): 150
GAIT LEVEL OF ASSIST: SUPERVISED

## 2022-03-18 ASSESSMENT — COGNITIVE AND FUNCTIONAL STATUS - GENERAL
DAILY ACTIVITIY SCORE: 24
SUGGESTED CMS G CODE MODIFIER DAILY ACTIVITY: CH

## 2022-03-18 ASSESSMENT — PAIN DESCRIPTION - PAIN TYPE
TYPE: SURGICAL PAIN
TYPE: SURGICAL PAIN

## 2022-03-18 ASSESSMENT — ACTIVITIES OF DAILY LIVING (ADL): TOILETING: INDEPENDENT

## 2022-03-18 NOTE — PROGRESS NOTES
Reviewed discharge instructions with patient. Patient show understanding of discharge instructions, all questions answered. IV  Dc'd. Patient meet criteria  for discharge. Discharge paper signed. Patient was escorted outside hospital on a wheelchair with CNA.

## 2022-03-18 NOTE — PROGRESS NOTES
Received bedside patient report from NORMA Lauren and NORMA Weber. Patient resting comfortably in bed, no complaints at this time. Safety precautions in place. Will continue to monitor.

## 2022-03-18 NOTE — PROGRESS NOTES
"S: Patient doing well, mobilizing without difficulty.    O: Patient has good pain control, good mobility, and operated leg is neurovascularly intact.    Dressing is clean, dry, and intact    Blood pressure (!) 92/53, pulse 69, temperature 36.7 °C (98 °F), temperature source Oral, resp. rate 18, height 1.626 m (5' 4\"), weight 82.8 kg (182 lb 8.7 oz), last menstrual period 01/01/2016, SpO2 97 %.            Intake/Output Summary (Last 24 hours) at 3/18/2022 0719  Last data filed at 3/17/2022 1841  Gross per 24 hour   Intake 2020 ml   Output 300 ml   Net 1720 ml             A:  Patient is stable and comfortable and ready for discharge  Patient Active Problem List    Diagnosis Date Noted   • Status post left hip replacement 03/17/2022   • Throat discomfort 02/14/2022   • Recurrent major depressive disorder, in remission (HCC) 12/28/2021   • Degenerative disc disease, lumbar 12/28/2021   • Primary osteoarthritis of left hip 12/06/2021   • Incontinence of urine in female 11/10/2021   • Prediabetes 11/10/2021   • Spondylolisthesis 07/13/2021   • Spinal stenosis of lumbar region 07/13/2021   • Second hand smoke exposure 05/13/2021   • BMI 30.0-30.9,adult 05/13/2021   • Back pain with radiculopathy 05/13/2021   • Peripheral venous insufficiency 01/10/2020   • Osteopenia of lumbar spine 05/27/2015   • Right carpal tunnel syndrome 05/27/2015   • Hyperlipidemia 01/27/2015   • Osteoarthritis of right hip 01/27/2015   • Vitiligo 01/27/2015         PLAN:Patient ready for discharge to home if they are safe and comfortable  "

## 2022-03-18 NOTE — THERAPY
Occupational Therapy   Initial Evaluation     Patient Name: Garima Banda  Age:  71 y.o., Sex:  female  Medical Record #: [id number]  Today's Date: 3/18/2022     Precautions: (P) No Weight Bearing Restrictions Left Lower Extremity,Anterior Hip Precautions    Assessment  Pt is a 71 year old female with recent L JACOBO placement seen POD #1 for OT evaluation. Pt was able to adhere to anterior hip precautions during functional mobility and ADL tasks today with FWW and minimal verbal cues. Pt is well equipped with all appropriate AE and lives with sister in law who can assist as needed during during post surgical recovery period. Anticipate DC home with family assist PRN. No further acute OT needs indicated at this time.    Plan    Recommend Occupational Therapy for Evaluation only     DC Equipment Recommendations: (P) None  Discharge Recommendations: (P) Anticipate that the patient will have no further occupational therapy needs after discharge from the hospital      03/18/22 0851   Prior Living Situation   Prior Services None   Housing / Facility 1 Story House   Steps Into Home 1   Steps In Home 0   Bathroom Set up Walk In Shower;Shower Chair   Equipment Owned Raised Toilet Seat Without Arms;Tub / Shower Seat;Front-Wheel Walker;Single Point Cane   Lives with - Patient's Self Care Capacity   (sister in law)   Comments Pt lives with sister in law who can assist as needed up DC   Prior Level of ADL Function   Self Feeding Independent   Grooming / Hygiene Independent   Bathing Independent   Dressing Independent   Toileting Independent   Prior Level of IADL Function   Medication Management Independent   Laundry Independent   Kitchen Mobility Independent   Finances Independent   Home Management Independent   Shopping Independent   Prior Level Of Mobility Independent Without Device in Community;Independent With Device in Community   History of Falls   History of Falls No   Precautions   Precautions No Weight Bearing  Restrictions Left Lower Extremity;Anterior Hip Precautions   Vitals   Vitals Comments BP: 99/57 seated EOB, RN aware   Pain   Pain Scales 0 to 10 Scale    Pain 0 - 10 Group   Location Hip   Location Orientation Left   Pain Rating Scale (NPRS) 2   Therapist Pain Assessment Post Activity Pain Same as Prior to Activity;2   Cognition    Cognition / Consciousness WDL   Comments pleasant and receptive   Active ROM Upper Body   Active ROM Upper Body  WDL   Strength Upper Body   Upper Body Strength  WDL   Balance Assessment   Sitting Balance (Static) Good   Sitting Balance (Dynamic) Good   Standing Balance (Static) Fair +   Standing Balance (Dynamic) Fair +   Weight Shift Sitting Good   Weight Shift Standing Good   Bed Mobility    Supine to Sit Supervised   Sit to Supine Supervised   Scooting Supervised   ADL Assessment   Grooming Supervision;Standing   Upper Body Dressing Supervision   Lower Body Dressing Supervision   Toileting Supervision   How much help from another person does the patient currently need...   Putting on and taking off regular lower body clothing? 4   Bathing (including washing, rinsing, and drying)? 4   Toileting, which includes using a toilet, bedpan, or urinal? 4   Putting on and taking off regular upper body clothing? 4   Taking care of personal grooming such as brushing teeth? 4   Eating meals? 4   6 Clicks Daily Activity Score 24   Functional Mobility   Sit to Stand Supervised   Bed, Chair, Wheelchair Transfer Supervised   Toilet Transfers Supervised   Patient / Family Goals   Patient / Family Goal #1 to get back to being independent   Education Group   Education Provided Transfers;Activities of Daily Living;Home Safety;Hip Precautions   Role of Occupational Therapist Patient Response Patient;Acceptance;Explanation   Hip Precautions Patient Response Patient;Acceptance;Explanation;Demonstration;Action Demonstration   Home Safety Patient Response Patient;Acceptance;Explanation   Transfers Patient  Response Patient;Acceptance;Explanation;Action Demonstration   ADL Patient Response Patient;Acceptance;Explanation;Action Demonstration   Anticipated Discharge Equipment and Recommendations   DC Equipment Recommendations None   Discharge Recommendations Anticipate that the patient will have no further occupational therapy needs after discharge from the hospital   Interdisciplinary Plan of Care Collaboration   IDT Collaboration with  Nursing   Patient Position at End of Therapy In Bed;Call Light within Reach;Tray Table within Reach;Phone within Reach   Collaboration Comments OT findings and recs

## 2022-03-18 NOTE — CARE PLAN
The patient is Stable - Low risk of patient condition declining or worsening    Shift Goals  Clinical Goals: ambulate 50 ft; void within 6 hours  Patient Goals: ambulate 50 ft; void within 6 hours    Progress made toward(s) clinical / shift goals:    Problem: Knowledge Deficit - Standard  Goal: Patient and family/care givers will demonstrate understanding of plan of care, disease process/condition, diagnostic tests and medications  Outcome: Progressing     Problem: Pain - Standard  Goal: Alleviation of pain or a reduction in pain to the patient’s comfort goal  Outcome: Progressing     Problem: Bowel Elimination  Goal: Establish and maintain regular bowel function  Outcome: Progressing     Problem: Respiratory/Oxygenation Function Post-Surgical  Goal: Patient will achieve/maintain normal respiratory rate/effort  Outcome: Progressing     Problem: Incision Care  Goal: Optimal post surgical incision care  Outcome: Progressing     Problem: Pain - Post Surgery  Goal: Alleviation or reduction of pain post surgery  Outcome: Progressing  Goal: Proper management of On-Q Pump  Outcome: Progressing     Problem: Post-Operative Hip Replacement  Goal: Patient will demonstrate understanding of post-surgical hip precautions, weight bearing restrictions and DME usage during hospital stay and post discharge  Outcome: Progressing  Goal: Patient's neurovascular status will be maintained or improve  Outcome: Progressing  Goal: Early mobilization post surgery  Outcome: Progressing  Goal: Proper fit of orthotic device will be assessed and maintained  Outcome: Progressing       Patient is not progressing towards the following goals:

## 2022-03-18 NOTE — PROGRESS NOTES
Assumed Pt. Care at 0730  Pt. A&Ox4  No sign of cardiac and respiratory distress.  Pain is 2/10 on her left hip. Polar ice in place.  Pt. Is comfortable in bed.  Bed at lowest position.  Call light and personal belonging within reach.   Encourage to call for assistance.    Pending OT Eval.

## 2022-03-18 NOTE — DISCHARGE SUMMARY
Discharge Summary    CHIEF COMPLAINT ON ADMISSION  No chief complaint on file.  Left hip pain    Reason for Admission  Primary osteoarthritis of left hip*     Admission Date  3/17/2022    CODE STATUS  Full Code    HPI & HOSPITAL COURSE  This is a 71 y.o. female here with severe DJD left hip and underwent  Left JACOBO. She has done well without problems   No notes on file    Therefore, she is discharged in good and stable condition to home with close outpatient follow-up.    The patient recovered much more quickly than anticipated on admission.    Discharge Date  3/18/22    FOLLOW UP ITEMS POST DISCHARGE  See me in 1-2 weeks    DISCHARGE DIAGNOSES  Principal Problem:    Primary osteoarthritis of left hip (Chronic) POA: Unknown      Overview: Plan for surgery on May 16  Dr Muir LUIZ  Active Problems:    Status post left hip replacement POA: Yes  Resolved Problems:    * No resolved hospital problems. *      FOLLOW UP  Future Appointments   Date Time Provider Department Center   3/28/2022  1:40 PM Chavo Otto M.D. [facility]   5/16/2022 10:00 AM JENNYFER Real     No follow-up provider specified.    MEDICATIONS ON DISCHARGE     Medication List      CONTINUE taking these medications      Instructions   acetaminophen 500 MG Tabs  Commonly known as: TYLENOL   Take 500-1,000 mg by mouth every 6 hours as needed for Moderate Pain.  Dose: 500-1,000 mg     aspirin 81 MG EC tablet   Take 1 Tablet by mouth every day for 42 days.  Dose: 81 mg     atorvastatin 40 MG Tabs  Commonly known as: LIPITOR   Take 1 Tablet by mouth every day.  Dose: 40 mg     CoQ-10 100 MG Caps   Take 300 mg by mouth every day.  Dose: 300 mg     diphenhydrAMINE 25 MG Tabs  Commonly known as: BENADRYL   Take 25 mg by mouth as needed for Sleep.  Dose: 25 mg     DULoxetine 20 MG Cpep  Commonly known as: CYMBALTA   Take 1 Capsule by mouth 2 times a day.  Dose: 20 mg     meloxicam 7.5 MG Tabs  Commonly known as: MOBIC    TAKE 1 TABLET BY MOUTH EVERY DAY  Dose: 7.5 mg     Mirabegron ER 25 MG Tb24   Take 1 Tablet by mouth every day.  Dose: 1 Tablet     omeprazole 20 MG delayed-release capsule  Commonly known as: PRILOSEC   Take 1 Capsule by mouth every day.  Dose: 20 mg     QC CALCIUM 500MG-D3 PO   Take  by mouth every day.     traMADol 50 MG Tabs  Commonly known as: ULTRAM   Take 1 Tablet by mouth every 4 hours for 7 days.  Dose: 50 mg     triamcinolone acetonide 0.1 % Crea  Commonly known as: KENALOG   3/17/22 Patient reports no longer taking            Allergies  No Known Allergies    DIET  Orders Placed This Encounter   Procedures   • Diet Order Diet: Regular     Standing Status:   Standing     Number of Occurrences:   1     Order Specific Question:   Diet:     Answer:   Regular [1]       ACTIVITY  As tolerated.  Weight bearing as tolerated    CONSULTATIONS  PT/OT    PROCEDURES  L JACOBO    LABORATORY  Lab Results   Component Value Date    SODIUM 141 03/02/2022    POTASSIUM 4.2 03/02/2022    CHLORIDE 105 03/02/2022    CO2 25 03/02/2022    GLUCOSE 83 03/02/2022    BUN 14 03/02/2022    CREATININE 0.53 03/02/2022        Lab Results   Component Value Date    WBC 5.2 03/02/2022    HEMOGLOBIN 12.1 03/02/2022    HEMATOCRIT 38.0 03/02/2022    PLATELETCT 280 03/02/2022        Total time of the discharge process exceeds 5 minutes.

## 2022-03-18 NOTE — DISCHARGE INSTRUCTIONS
Discharge Instructions    Discharged to home by car with relative. Discharged via wheelchair, hospital escort: Yes.  Special equipment needed: Not Applicable    Be sure to schedule a follow-up appointment with your primary care doctor or any specialists as instructed.     Discharge Plan:   Influenza Vaccine Indication: Patient Refuses    I understand that a diet low in cholesterol, fat, and sodium is recommended for good health. Unless I have been given specific instructions below for another diet, I accept this instruction as my diet prescription.   Other diet: Regular    Special Instructions: Discharge instructions for the Orthopedic Patient    Follow up with Primary Care Physician within 2 weeks of discharge to home, regarding:  Review of medications and diagnostic testing.  Surveillance for medical complications.  Workup and treatment of osteoporosis, if appropriate.     -Is this a Hip/Knee/Shoulder Joint Replacement patient? Yes   TOTAL HIP REPLACEMENT, AFTER-CARE GUIDELINES     These instructions provide you with information on caring for yourself and your hip after surgery. Your health care provider may also give you instructions that are more specific. Your treatment was planned and performed according to current medical practices but problems sometimes occur. Call your health care provider if you have any problems or questions.     WHAT TO EXPECT AFTER THE PROCEDURE   After your procedure, your hip will typically be stiff, sore, and bruised. This will improve over time.     Pain   · Follow your home pain management plan as discussed with your nurse and as directed by your provider.   · It is important to follow any scheduled pain medications for maximal pain relief.   · If prescribed opioid medication, the goal is to use opioids only as needed and to wean off prescription pain medicine as soon as possible.   · Ice use for pain control.  · Put ice in a plastic bag.   · Place a towel between your skin and the  bag.   · Leave the ice on for 20 minutes, 2-3 times a day at a minimum.   · Most patients are off the pain pills by 3 weeks. If your pain continues to be severe, follow up with your provider.     Infection   Deep hip joint infections that require removal of the prostheses occur in less than 1.0% of patients. Lesser infections in the skin (cellulites) are more common and much more easily treated.   · Keep the incision as clean and dry as possible.   · Always wash your hands before touching your incision.   · Avoid dental care for 3 months after surgery. Your provider may recommend taking a dose of antibiotics an hour prior to any dental procedure. After 2 years, most providers recommend antibiotics only before an extensive procedure. Ask your provider what they recommend.   · Signs and symptoms of infection include low-grade fever, redness, pain, swelling and drainage from your incision. Notify your provider IMMEDIATELY if you develop ANY of these symptoms.     Post op Disturbances   · Bowel Habits - constipation is extremely common and caused by a combination of anesthesia, lack of mobility, dehydration and pain medicine. Use stool softeners or laxatives if necessary. It is important not to ignore this problem as bowel obstructions can be a serious complication after joint replacement surgery.   · Mood/Energy Level - Many patients experience a lack of energy and endurance for up to 2-3 months after surgery. Some people feel down and can even become depressed. This is likely due to postoperative anemia, change in activity level, lack of sleep, pain medicine and just the emotional reaction to the surgery itself that is a big disruption in a person’s life. This usually passes. If symptoms persist, follow up with your primary care provider.   · Returning to Work - Your provider will give you specific instructions based on your profession. Generally, if you work a sedentary job requiring little standing or walking, most  patients may return within 2-6 weeks. Manual labor jobs involving walking, lifting and standing may take 3-4 months. Your provider’s office can provide a release to part-time or light duty work early on in your recovery and progress you to full duty as able.   · Driving - You can begin driving once cleared by your provider, provided you are no longer taking narcotic pain medication or any other medications that impair driving. Discuss the length of time with your doctor as returning to driving will depend on things such as your vehicle, which hip was replaced (right or left) and if you do or do not have post-operative movement precautions.   · Avoiding falls - A fall during the first few weeks after surgery can damage your new hip and may result in a need for further surgery.  throw rugs and tack down loose carpeting. Be aware of floor hazards such as pets, small objects or uneven surfaces. Notify your provider of any falls.   · Airport Metal Detectors - The sensitivity of metal detectors varies and it is likely that your prosthesis will cause an alarm. Inform the  of your artificial joint.     Diet   · Resume your normal diet as tolerated.   · It is important to achieve a healthy nutritional status by eating a well-balanced diet on a regular basis.   · Your provider may recommend that you take iron and vitamin supplements.   · Continue to drink plenty of fluids.     Shower/Bathing   · You may shower as soon as you get home from the hospital unless otherwise instructed.   · Keep your incision out of water to prevent infection. To keep the incision dry when showering, cover it with a plastic bag or plastic wrap. If your bandage is waterproof, this may not be necessary.   · Pat incision dry if it gets wet. Do not rub. Notify your provider.   · Do not submerge in a bath until cleared by your provider. Your staples must be out and the incision completely healed.     Dressing Changes: Only change  your dressing if directed by your provider.   · Wash hands.   · Open all dressing change materials.   · Remove old dressing and discard.   · Inspect incision for signs of irritation or infection including redness, increase in clear drainage, yellow/green drainage, odor and surrounding skin hot to touch. Notify your provider if present.   ·  the new dressing by one corner and lay over the incision. Be careful not to touch the inside of the dressing that will lay over the incision.   · Secure in place as instructed.     Swelling/Bruising   · Swelling is normal after hip replacement and can involve the thigh, knee, calf and foot.   · Swelling can last from 3-6 months.   · To reduce swelling, elevate your leg higher than your heart while reclining. The first week you are home you should elevate your leg an equal amount of time as you are active.   · The swelling is usually worse after you go home since you are upright for longer periods of time.   · Bruising often does not appear until after you arrive home and can be quite dramatic- appearing purple, black, or green. Bruising is typically not concerning and will subside without any treatment.     Blood Clot Prevention   Your treatment plan includes multiple preventative measures to decrease the risk of blood clots in the legs (DVTs) and the less common, but serious, clots that travel to the lungs (pulmonary emboli). Most patients are at standard risk for them, but people who are at higher risk include those who have had previous clots, a family history of clotting, smoking, diabetes, obesity, advanced age, use estrogen and/or live a sedentary lifestyle.     · Signs of blood clots in legs include - Swelling in thigh, calf or ankle that does not go down with elevation. Pain, heat and tenderness in calf, back of calf or groin area. NOTE: blood clots can occur in either leg.   · Signs of blood clots in lungs include - Sudden increased shortness of breath, sudden  onset of chest pain, and localized chest pain with coughing.   · If you experience any of the above symptoms, notify your provider and seek medical attention immediately.   · You received anticoagulant therapy (blood thinners) in the hospital. Continue the prescribed blood-thinning medication at home, as directed by your provider.   · Your risk for developing a clot continues for up to 2-3 months after surgery. Avoid prolonged sitting and dehydration (long air trips and car trips). If you do take a trip during this time, please get up, move around every 1-1.5 hours, and discuss all travel plans with your provider.     Activity   Once you get home, stay active. The key is not to overdo it. While you can expect some good days and some bad days, you should notice a gradual improvement over time and a gradual increase in endurance over the next 6 to 12 months.     · Weight Bearing - You can begin to bear weight as tolerated unless otherwise directed by your provider. Use a walker, crutches or cane to assist with walking until you can walk smoothly (minimal or no limp) without assistance.   · Physical Precautions - To assure proper recovery and tissue healing, you may be asked to take special precautions when moving (sitting, bending or sleeping) - usually for the first 6 weeks after surgery. Precautions vary from patient to patient depending on surgical approach used by your provider. Follow any specific precautions provided by your provider and physical therapist until cleared by your provider.   · Sitting - Early on it is easier to get up from a tall chair or a chair with arms. The physical therapist will show you how to sit and stand from a chair keeping your affected leg out in front of you. Get up and move around on a regular basis--at least once every hour.   · Walking - Walk as much as you like once your doctor gives permission to proceed, but remember that walking is no substitute for your prescribed  exercises.  · Follow the home exercise program prescribed during your hospital stay as directed by your physical therapist or provider.   · Continued physical therapy may be prescribed after hospital discharge to strengthen your muscles and improve your gait/walking pattern. The decision to have therapy or not after the hospital stay is made by you and your provider prior to surgery or at your follow up appointment.   · Swimming is an excellent low impact activity; you can begin as soon as the wound is healed and your provider clears you. Using a pair of training fins may make swimming a more enjoyable and effective exercise.   · Sexual activity - Your provider can tell you when it is safe to resume sexual activity.   · Other activities - Low impact activities are preferred. If you have specific questions, consult your provider.     When to Call the Doctor   Call the provider if you experience:   · Fever over 100.5° F   · Increased pain, drainage, redness, odor or heat around the incision area   · Shaking chills   · Increased knee pain with activity and rest   · Increased pain in calf, tenderness or redness above or below the knee   · Increased swelling of calf, ankle, foot   · Sudden increased shortness of breath, sudden onset of chest pain, localized chest pain with coughing   · Incision opening   Or, if there are any questions or concerns about medications or care.     Infection statistic resource:   https://www.[employer].com/contents/prosthetic-joint-infection-epidemiology-microbiology-clinical-manifestations-and-diagnosis     -Is this patient being discharged with medication to prevent blood clots?  Yes, Aspirin Aspirin, ASA oral tablets  What is this medicine?  ASPIRIN (AS pir in) is a pain reliever. It is used to treat mild pain and fever. This medicine is also used as directed by a doctor to prevent and to treat heart attacks, to prevent strokes and blood clots, and to treat arthritis or inflammation.  This  medicine may be used for other purposes; ask your health care provider or pharmacist if you have questions.  COMMON BRAND NAME(S): Aspir-Low, Aspir-Serena, Aspirtab, Michael Advanced Aspirin, Michael Aspirin, Michael Aspirin Extra Strength, Michael Aspirin Plus, Michael Extra Strength, Michael Extra Strength Plus, Michael Genuine Aspirin, Michael Womens Aspirin, Bufferin, Bufferin Extra Strength, Bufferin Low Dose  What should I tell my health care provider before I take this medicine?  They need to know if you have any of these conditions:  · anemia  · asthma  · bleeding problems  · child with chickenpox, the flu, or other viral infection  · diabetes  · gout  · if you frequently drink alcohol containing drinks  · kidney disease  · liver disease  · low level of vitamin K  · lupus  · smoke tobacco  · stomach ulcers or other problems  · an unusual or allergic reaction to aspirin, tartrazine dye, other medicines, dyes, or preservatives  · pregnant or trying to get pregnant  · breast-feeding  How should I use this medicine?  Take this medicine by mouth with a glass of water. Follow the directions on the package or prescription label. You can take this medicine with or without food. If it upsets your stomach, take it with food. Do not take your medicine more often than directed.  Talk to your pediatrician regarding the use of this medicine in children. While this drug may be prescribed for children as young as 12 years of age for selected conditions, precautions do apply. Children and teenagers should not use this medicine to treat chicken pox or flu symptoms unless directed by a doctor.  Patients over 65 years old may have a stronger reaction and need a smaller dose.  Overdosage: If you think you have taken too much of this medicine contact a poison control center or emergency room at once.  NOTE: This medicine is only for you. Do not share this medicine with others.  What if I miss a dose?  If you are taking this medicine on a regular  schedule and miss a dose, take it as soon as you can. If it is almost time for your next dose, take only that dose. Do not take double or extra doses.  What may interact with this medicine?  Do not take this medicine with any of the following medications:  · cidofovir  · ketorolac  · probenecid  This medicine may also interact with the following medications:  · alcohol  · alendronate  · bismuth subsalicylate  · flavocoxid  · herbal supplements like feverfew, garlic, kimmy, ginkgo biloba, horse chestnut  · medicines for diabetes or glaucoma like acetazolamide, methazolamide  · medicines for gout  · medicines that treat or prevent blood clots like enoxaparin, heparin, ticlopidine, warfarin  · other aspirin and aspirin-like medicines  · NSAIDs, medicines for pain and inflammation, like ibuprofen or naproxen  · pemetrexed  · sulfinpyrazone  · varicella live vaccine  This list may not describe all possible interactions. Give your health care provider a list of all the medicines, herbs, non-prescription drugs, or dietary supplements you use. Also tell them if you smoke, drink alcohol, or use illegal drugs. Some items may interact with your medicine.  What should I watch for while using this medicine?  If you are treating yourself for pain, tell your doctor or health care professional if the pain lasts more than 10 days, if it gets worse, or if there is a new or different kind of pain. Tell your doctor if you see redness or swelling. Also, check with your doctor if you have a fever that lasts for more than 3 days. Only take this medicine to prevent heart attacks or blood clotting if prescribed by your doctor or health care professional.  Do not take aspirin or aspirin-like medicines with this medicine. Too much aspirin can be dangerous. Always read the labels carefully.  This medicine can irritate your stomach or cause bleeding problems. Do not smoke cigarettes or drink alcohol while taking this medicine. Do not lie down  for 30 minutes after taking this medicine to prevent irritation to your throat.  If you are scheduled for any medical or dental procedure, tell your healthcare provider that you are taking this medicine. You may need to stop taking this medicine before the procedure.  This medicine may be used to treat migraines. If you take migraine medicines for 10 or more days a month, your migraines may get worse. Keep a diary of headache days and medicine use. Contact your healthcare professional if your migraine attacks occur more frequently.  What side effects may I notice from receiving this medicine?  Side effects that you should report to your doctor or health care professional as soon as possible:  · allergic reactions like skin rash, itching or hives, swelling of the face, lips, or tongue  · breathing problems  · changes in hearing, ringing in the ears  · confusion  · general ill feeling or flu-like symptoms  · pain on swallowing  · redness, blistering, peeling or loosening of the skin, including inside the mouth or nose  · signs and symptoms of bleeding such as bloody or black, tarry stools; red or dark-brown urine; spitting up blood or brown material that looks like coffee grounds; red spots on the skin; unusual bruising or bleeding from the eye, gums, or nose  · trouble passing urine or change in the amount of urine  · unusually weak or tired  · yellowing of the eyes or skin  Side effects that usually do not require medical attention (report to your doctor or health care professional if they continue or are bothersome):  · diarrhea or constipation  · headache  · nausea, vomiting  · stomach gas, heartburn  This list may not describe all possible side effects. Call your doctor for medical advice about side effects. You may report side effects to FDA at 9-551-FDA-3586.  Where should I keep my medicine?  Keep out of the reach of children.  Store at room temperature between 15 and 30 degrees C (59 and 86 degrees F).  Protect from heat and moisture. Do not use this medicine if it has a strong vinegar smell. Throw away any unused medicine after the expiration date.  NOTE: This sheet is a summary. It may not cover all possible information. If you have questions about this medicine, talk to your doctor, pharmacist, or health care provider.  © 2020 [employer]/Gold Standard (2018-01-30 10:42:13)      · Is patient discharged on Warfarin / Coumadin?   No     Depression / Suicide Risk    As you are discharged from this [facility] facility, it is important to learn how to keep safe from harming yourself.    Recognize the warning signs:  · Abrupt changes in personality, positive or negative- including increase in energy   · Giving away possessions  · Change in eating patterns- significant weight changes-  positive or negative  · Change in sleeping patterns- unable to sleep or sleeping all the time   · Unwillingness or inability to communicate  · Depression  · Unusual sadness, discouragement and loneliness  · Talk of wanting to die  · Neglect of personal appearance   · Rebelliousness- reckless behavior  · Withdrawal from people/activities they love  · Confusion- inability to concentrate     If you or a loved one observes any of these behaviors or has concerns about self-harm, here's what you can do:  · Talk about it- your feelings and reasons for harming yourself  · Remove any means that you might use to hurt yourself (examples: pills, rope, extension cords, firearm)  · Get professional help from the community (Mental Health, Substance Abuse, psychological counseling)  · Do not be alone:Call your Safe Contact- someone whom you trust who will be there for you.  · Call your local CRISIS HOTLINE 459-1684 or 555-660-2450  · Call your local Children's Mobile Crisis Response Team [place] (749) 316-1393 or www.[employer]  · Call the toll free National Suicide Prevention Hotlines   · National Suicide Prevention Lifeline 831-268-ZTOG  (4844)  · [facility] 800-SUICIDE (690-6096)

## 2022-03-18 NOTE — THERAPY
Physical Therapy   Daily Treatment     Patient Name: Garima Banda  Age:  71 y.o., Sex:  female  Medical Record #: [id number]  Today's Date: 3/18/2022     Precautions  Precautions: No Weight Bearing Restrictions Left Lower Extremity;Anterior Hip Precautions    Assessment    Pt is safe with bed mob,transfers ,ambulation and stairs.She understands HEP and has no equipment needs    Plan       03/18/22 1100   Total Time Spent   Total Time Spent (Mins) 25   Charge Group   PT Gait Training 1   PT Therapeutic Activities 1   Pain 0 - 10 Group   Therapist Pain Assessment 2   Supine Lower Body Exercise   Supine Lower Body Exercises Yes   Sitting Lower Body Exercises   Sitting Lower Body Exercises Yes   Standing Lower Body Exercises   Standing Lower Body Exercises Yes   Home Exercise Program   Home Exercise Program Patient Able to Complete Home Program Independently, Safely   Balance   Sitting Balance (Static) Good   Sitting Balance (Dynamic) Good   Standing Balance (Static) Good   Standing Balance (Dynamic) Good   Weight Shift Sitting Good   Weight Shift Standing Good   Gait Analysis   Gait Level Of Assist Supervised   Assistive Device Front Wheel Walker   Distance (Feet) 150   # of Times Distance was Traveled 2   Deviation Step To   # of Stairs Climbed 1   Level of Assist with Stairs Supervised   Bed Mobility    Supine to Sit Supervised   Sit to Supine Supervised   Scooting Supervised   Functional Mobility   Sit to Stand Supervised   Bed, Chair, Wheelchair Transfer Supervised   Toilet Transfers Supervised   Activity Tolerance   Sitting Edge of Bed 10   Standing 10   Short Term Goals    Short Term Goal # 1 I with HEP   Goal Outcome # 1 Goal met   Short Term Goal # 2 Mod I amb x 200 feet in 2 V   Goal Outcome # 2 Goal met   Short Term Goal # 3 S x 1 stair in 2 V   Goal Outcome # 3 Goal met   Interdisciplinary Plan of Care Collaboration   IDT Collaboration with  Nursing   Session Information   Date / Session Number  3/18-2  2/7 3/23   Discharge secondary to goals met.    DC Equipment Recommendations: None  Discharge Recommendations: Recommend outpatient physical therapy services to address higher level deficits

## 2022-03-18 NOTE — CARE PLAN
The patient is Stable - Low risk of patient condition declining or worsening    Shift Goals  Clinical Goals: Patient will be able to walk 50 feet this shift.  Patient Goals: Able to void prior to discharge    Progress made toward(s) clinical / shift goals:  Patient walked at least 100 feet with PT to the PT room. Patient is ready for discharge.      Problem: Knowledge Deficit - Standard  Goal: Patient and family/care givers will demonstrate understanding of plan of care, disease process/condition, diagnostic tests and medications  Outcome: Progressing     Problem: Post-Operative Hip Replacement  Goal: Patient will demonstrate understanding of post-surgical hip precautions, weight bearing restrictions and DME usage during hospital stay and post discharge  Outcome: Progressing     Problem: Post-Operative Hip Replacement  Goal: Patient's neurovascular status will be maintained or improve  Outcome: Progressing     Problem: Post-Operative Hip Replacement  Goal: Early mobilization post surgery  Outcome: Progressing

## 2022-03-18 NOTE — PROGRESS NOTES
Patient able to walk at least 50 feet before midnight. First ambulation attempt completed with 2 person and a front wheeled walker. Patient tolerated ambulation with tolerable pain to surgical site. Patient also able to void.

## 2022-03-31 RX ORDER — MELOXICAM 7.5 MG/1
7.5 TABLET ORAL DAILY
Qty: 30 TABLET | Refills: 0 | Status: SHIPPED | OUTPATIENT
Start: 2022-03-31 | End: 2022-05-04 | Stop reason: SDUPTHER

## 2022-03-31 NOTE — TELEPHONE ENCOUNTER
Received request via: Pharmacy    Was the patient seen in the last year in this department? Yes    Does the patient have an active prescription (recently filled or refills available) for medication(s) requested? No     Requested Prescriptions     Pending Prescriptions Disp Refills   • meloxicam (MOBIC) 7.5 MG Tab [Pharmacy Med Name: MELOXICAM 7.5 MG TABLET] 30 Tablet 0     Sig: TAKE 1 TABLET BY MOUTH EVERY DAY

## 2022-04-13 ENCOUNTER — RESEARCH ENCOUNTER (OUTPATIENT)
Dept: MEDICAL GROUP | Facility: PHYSICIAN GROUP | Age: 72
End: 2022-04-13
Payer: MEDICARE

## 2022-04-13 DIAGNOSIS — Z00.6 RESEARCH STUDY PATIENT: ICD-10-CM

## 2022-05-15 LAB
APOB+LDLR+PCSK9 GENE MUT ANL BLD/T: NOT DETECTED
BRCA1+BRCA2 DEL+DUP + FULL MUT ANL BLD/T: NOT DETECTED
MLH1+MSH2+MSH6+PMS2 GN DEL+DUP+FUL M: NOT DETECTED

## 2022-05-16 ENCOUNTER — APPOINTMENT (OUTPATIENT)
Dept: MEDICAL GROUP | Facility: MEDICAL CENTER | Age: 72
End: 2022-05-16
Payer: MEDICARE

## 2025-01-25 NOTE — ANESTHESIA PROCEDURE NOTES
Airway    Date/Time: 10/20/2021 7:07 AM  Performed by: Darrell Payne M.D.  Authorized by: Darrell Payne M.D.     Location:  OR  Urgency:  Elective  Indications for Airway Management:  Anesthesia      Spontaneous Ventilation: absent    Sedation Level:  Deep  Preoxygenated: Yes    Patient Position:  Sniffing  Mask Difficulty Assessment:  1 - vent by mask  Final Airway Type:  Endotracheal airway  Final Endotracheal Airway:  ETT  Cuffed: Yes    Technique Used for Successful ETT Placement:  Direct laryngoscopy  Devices/Methods Used in Placement:  Intubating stylet    Insertion Site:  Oral  Blade Type:  Radhika  Laryngoscope Blade/Videolaryngoscope Blade Size:  3  ETT Size (mm):  7.0  Measured from:  Teeth  ETT to Teeth (cm):  22  Placement Verified by: auscultation and capnometry    Cormack-Lehane Classification:  Grade I - full view of glottis  Number of Attempts at Approach:  1          
Yes
Negative Screen

## (undated) DEVICE — GOWN WARMING STANDARD FLEX - (30/CA)

## (undated) DEVICE — SENSOR SPO2 NEO LNCS ADHESIVE (20/BX) SEE USER NOTES

## (undated) DEVICE — TIP INTPLS HFLO ML ORFC BTRY - (12/CS)  FOR SURGILAV

## (undated) DEVICE — GLOVE BIOGEL SZ 7.5 SURGICAL PF LTX - (50PR/BX 4BX/CA)

## (undated) DEVICE — KIT ANESTHESIA W/CIRCUIT & 3/LT BAG W/FILTER (20EA/CA)

## (undated) DEVICE — GLOVE BIOGEL PI INDICATOR SZ 8.5 SURGICAL PF LF - (50PR/BX 4BX/CA)

## (undated) DEVICE — SUTURE 3-0 MONOCRYL PLUS PS-1 - 27 INCH (36/BX)

## (undated) DEVICE — SLEEVE, VASO, THIGH, MED

## (undated) DEVICE — PAD UNIVERSAL MULTI USE (1/EA)

## (undated) DEVICE — ELECTRODE DUAL RETURN W/ CORD - (50/PK)

## (undated) DEVICE — SUTURE 1 VICRYL PLUS CTX - 8 X 18 INCH (12/BX)

## (undated) DEVICE — SUTURE 2-0 VICRYL PLUS CT-1 - 8 X 18 INCH(12/BX)

## (undated) DEVICE — SUTURE GENERAL

## (undated) DEVICE — HANDPIECE 10FT INTPLS SCT PLS IRRIGATION HAND CONTROL SET (6/PK)

## (undated) DEVICE — BAG SPONGE COUNT 10.25 X 32 - BLUE (250/CA)

## (undated) DEVICE — CANISTER SUCTION RIGID RED 1500CC (40EA/CA)

## (undated) DEVICE — SET LEADWIRE 5 LEAD BEDSIDE DISPOSABLE ECG (1SET OF 5/EA)

## (undated) DEVICE — LACTATED RINGERS INJ 1000 ML - (14EA/CA 60CA/PF)

## (undated) DEVICE — STOCKINET TUBULAR 6IN STERILE - 6 X 48YDS (25/CA)

## (undated) DEVICE — HUMID-VENT HEAT AND MOISTURE EXCHANGE- (50/BX)

## (undated) DEVICE — HEAD HOLDER JUNIOR/ADULT

## (undated) DEVICE — NEPTUNE 4 PORT MANIFOLD - (20/PK)

## (undated) DEVICE — TOWEL STOP TIMEOUT SAFETY FLAG (40EA/CA)

## (undated) DEVICE — SODIUM CHL. IRRIGATION 0.9% 3000ML (4EA/CA 65CA/PF)

## (undated) DEVICE — SUCTION INSTRUMENT YANKAUER BULBOUS TIP W/O VENT (50EA/CA)

## (undated) DEVICE — TUBE CONNECTING SUCTION - CLEAR PLASTIC STERILE 72 IN (50EA/CA)

## (undated) DEVICE — TUBE E-T HI-LO CUFF 7.0MM (10EA/PK)

## (undated) DEVICE — DRESSING POST OP BORDER 4 X 10 (5EA/BX)

## (undated) DEVICE — LENS/HOOD FOR SPACESUIT - (32/PK) PEEL AWAY FACE

## (undated) DEVICE — SYRINGE SAFETY 3 ML 18 GA X 1 1/2 BLUNT LL (100/BX 8BX/CA)

## (undated) DEVICE — BLADE SAGITTAL SYSTEM 18MM

## (undated) DEVICE — DISPOSABLE WOUND VAC PICO 10 X 30 CM - WOUND CARE (3/CA)

## (undated) DEVICE — MASK ANESTHESIA ADULT  - (100/CA)

## (undated) DEVICE — TUBING CLEARLINK DUO-VENT - C-FLO (48EA/CA)

## (undated) DEVICE — SET EXTENSION WITH 2 PORTS (48EA/CA) ***PART #2C8610 IS A SUBSTITUTE*****

## (undated) DEVICE — GLOVE BIOGEL PI INDICATOR SZ 7.5 SURGICAL PF LF -(50/BX 4BX/CA)

## (undated) DEVICE — PROTECTOR ULNA NERVE - (36PR/CA)

## (undated) DEVICE — GLOVE BIOGEL PI ORTHO SZ 8.5 PF LF (40/BX)

## (undated) DEVICE — PACK TOTAL HIP - (1/CA)

## (undated) DEVICE — DRESSING AQUACEL AG ADVANTAGE 3.5 X 10" (10EA/BX)"

## (undated) DEVICE — ELECTRODE 850 FOAM ADHESIVE - HYDROGEL RADIOTRNSPRNT (50/PK)

## (undated) DEVICE — SLEEVE VASO CALF MED - (10PR/CA)

## (undated) DEVICE — SOD. CHL. INJ. 0.9% 1000 ML - (14EA/CA 60CA/PF)

## (undated) DEVICE — CANISTER SUCTION 3000ML MECHANICAL FILTER AUTO SHUTOFF MEDI-VAC NONSTERILE LF DISP  (40EA/CA)

## (undated) DEVICE — SODIUM CHL IRRIGATION 0.9% 1000ML (12EA/CA)